# Patient Record
Sex: FEMALE | Race: WHITE | Employment: UNEMPLOYED | URBAN - METROPOLITAN AREA
[De-identification: names, ages, dates, MRNs, and addresses within clinical notes are randomized per-mention and may not be internally consistent; named-entity substitution may affect disease eponyms.]

---

## 2018-11-14 ENCOUNTER — OFFICE VISIT (OUTPATIENT)
Dept: FAMILY MEDICINE CLINIC | Age: 58
End: 2018-11-14

## 2018-11-14 VITALS
RESPIRATION RATE: 16 BRPM | HEART RATE: 80 BPM | BODY MASS INDEX: 31.08 KG/M2 | HEIGHT: 67 IN | SYSTOLIC BLOOD PRESSURE: 101 MMHG | OXYGEN SATURATION: 98 % | WEIGHT: 198 LBS | DIASTOLIC BLOOD PRESSURE: 68 MMHG | TEMPERATURE: 98.1 F

## 2018-11-14 DIAGNOSIS — G89.29 CHRONIC BILATERAL LOW BACK PAIN WITHOUT SCIATICA: ICD-10-CM

## 2018-11-14 DIAGNOSIS — M54.6 ACUTE BILATERAL THORACIC BACK PAIN: ICD-10-CM

## 2018-11-14 DIAGNOSIS — M54.50 CHRONIC BILATERAL LOW BACK PAIN WITHOUT SCIATICA: ICD-10-CM

## 2018-11-14 DIAGNOSIS — A69.20 ACUTE LYME DISEASE: Primary | ICD-10-CM

## 2018-11-14 DIAGNOSIS — Z23 ENCOUNTER FOR IMMUNIZATION: ICD-10-CM

## 2018-11-14 RX ORDER — DEXMETHYLPHENIDATE HYDROCHLORIDE 30 MG/1
CAPSULE, EXTENDED RELEASE ORAL
COMMUNITY
End: 2019-06-25

## 2018-11-14 RX ORDER — MELOXICAM 7.5 MG/1
15 TABLET ORAL DAILY
Qty: 30 TAB | Refills: 5 | Status: SHIPPED | OUTPATIENT
Start: 2018-11-14 | End: 2021-06-10

## 2018-11-14 RX ORDER — CYCLOBENZAPRINE HCL 10 MG
10 TABLET ORAL
Qty: 30 TAB | Refills: 5 | Status: SHIPPED | OUTPATIENT
Start: 2018-11-14

## 2018-11-14 RX ORDER — MELOXICAM 7.5 MG/1
TABLET ORAL DAILY
COMMUNITY
End: 2018-11-14

## 2018-11-14 RX ORDER — DOXYCYCLINE 100 MG/1
100 TABLET ORAL 2 TIMES DAILY
Qty: 28 TAB | Refills: 0 | Status: SHIPPED | OUTPATIENT
Start: 2018-11-14 | End: 2018-11-28

## 2018-11-14 RX ORDER — DEXMETHYLPHENIDATE HYDROCHLORIDE 10 MG/1
10 TABLET ORAL 2 TIMES DAILY
COMMUNITY
End: 2019-06-25

## 2018-11-14 RX ORDER — BUPROPION HYDROCHLORIDE 150 MG/1
TABLET, EXTENDED RELEASE ORAL 2 TIMES DAILY
COMMUNITY
End: 2021-06-10

## 2018-11-14 RX ORDER — TRAZODONE HYDROCHLORIDE 100 MG/1
100 TABLET ORAL
COMMUNITY
End: 2021-06-10

## 2018-11-14 NOTE — PATIENT INSTRUCTIONS
Rhomboid Muscle Strain: Rehab Exercises Your Care Instructions Here are some examples of typical rehabilitation exercises for your condition. Start each exercise slowly. Ease off the exercise if you start to have pain. Your doctor or physical therapist will tell you when you can start these exercises and which ones will work best for you. How to do the exercises Lower neck and upper back (rhomboid) stretch 1. Stretch your arms out in front of your body. Clasp one hand on top of your other hand. 2. Gently reach out so that you feel your shoulder blades stretching away from each other. 3. Gently bend your head forward. 4. Hold for 15 to 30 seconds. 5. Repeat 2 to 4 times. Resisted rows 1. Put the band around a solid object, such as a bedpost, at about waist level. Stand facing where you have placed the band. Hold equal lengths of the band in each hand. 2. Start with your arms held out in front of you. 3. Pull the bands back, and move your shoulder blades together. As you finish, your elbows should be at your side and bent at 90 degrees (like the angle of the letter \"L\"). 4. Return to the starting position. 5. Repeat 8 to 12 times. Neck stretches 1. Look straight ahead, and tip your right ear to your right shoulder. Do not let your left shoulder rise as you tip your head to the right. 2. Hold for 15 to 30 seconds. 3. Tilt your head to the left. Do not let your right shoulder rise as you tip your head to the left. 4. Hold for 15 to 30 seconds. 5. Repeat 2 to 4 times to each side. Neck rotation 1. Sit in a firm chair, or stand up straight. 2. Keeping your chin level, turn your head to the right, and hold for 15 to 30 seconds. 3. Turn your head to the left, and hold for 15 to 30 seconds. 4. Repeat 2 to 4 times to each side. Follow-up care is a key part of your treatment and safety.  Be sure to make and go to all appointments, and call your doctor if you are having problems. It's also a good idea to know your test results and keep a list of the medicines you take. Where can you learn more? Go to http://jo-brittney.info/. Enter 0841 31 00 89 in the search box to learn more about \"Rhomboid Muscle Strain: Rehab Exercises. \" Current as of: November 29, 2017 Content Version: 11.8 © 5654-5553 [employer]. Care instructions adapted under license by [employer] (which disclaims liability or warranty for this information). If you have questions about a medical condition or this instruction, always ask your healthcare professional. [place] [address] any warranty or liability for your use of this information.

## 2018-11-14 NOTE — PROGRESS NOTES
Chief Complaint Patient presents with [facility]  Back Pain  Dizziness  Immunization/Injection  
  flu shot Patient presents in office today to establish care and also to f/u for tick bite. Tick bite occurred Thursday and Sunday noticed a red ring around the bite. There is no ring present today. Would like to discuss back pain. States she has a loss of appetite. Also has c/o dizziness that she has had for several years. Would like to get flu shot No other concerns.

## 2018-11-14 NOTE — PROGRESS NOTES
Leslie Perez is a 60 y.o. female Chief Complaint Patient presents with [facility]  Back Pain  Dizziness  Immunization/Injection  
  flu shot Pt states she was bit by a tick last week then had a ring around it and is now gone. States the ring was small and has resolved. Pt does not know how long the tick was present. Discussed waiting to test vs treating and we will treat now 14 days. Pt also reports when she stands to cook or do anything she will get a sharp pain. States no hx of known arthritis, but back does feel more tight and less mobile. Pt has been using mobic 7.5 mg daily and this is not working. Pt was prev on MS Contin. States with the MS Contin she did sleep better. Pt reports 2 prior back surgeries. States the back is workers comp. Pt sees Dr Ilia Sin for her pain management and ADD medication. States she had 2 laminectomies. States current pain is not a nerve pain states it is more of a constant ache. IS also having pain in her thoracic spine into neck between shoulder blades. she is a 60y.o. year old female who presents for evalution. Reviewed PmHx, RxHx, FmHx, SocHx, AllgHx and updated and dated in the chart. Review of Systems - negative except as listed above in the HPI Objective:  
 
Vitals:  
 11/14/18 1427 BP: 101/68 Pulse: 80 Resp: 16 Temp: 98.1 °F (36.7 °C) TempSrc: Oral  
SpO2: 98% Weight: 198 lb (89.8 kg) Height: 5' 7\" (1.702 m) Current Outpatient Medications Medication Sig  
 buPROPion SR (WELLBUTRIN SR) 150 mg SR tablet Take  by mouth two (2) times a day.  Dexmethylphenidate 30 mg BP50 Take by mouth.  dexmethylphenidate 10 mg tablet Take 10 mg by mouth two (2) times a day.  traZODone (DESYREL) 100 mg tablet Take 100 mg by mouth nightly.  doxycycline (ADOXA) 100 mg tablet Take 1 Tab by mouth two (2) times a day for 14 days. May use caps if insurance requires  cyclobenzaprine (FLEXERIL) 10 mg tablet Take 1 Tab by mouth nightly as needed for Muscle Spasm(s).  meloxicam (MOBIC) 7.5 mg tablet Take 2 Tabs by mouth daily.  CHOLECALCIFEROL, VITAMIN D3, (VITAMIN D3 PO) Take  by mouth.  DULoxetine (CYMBALTA) 30 mg capsule  DULOXETINE HCL (CYMBALTA PO) Take  by mouth.  methylphenidate (RITALIN) 10 mg tablet Take  by mouth.  Glucosamine &Chondroit-MV-Min3 [id number] mg tab Take  by mouth.  CALCIUM PO Take  by mouth.  KRILL OIL PO Take  by mouth.  PV W-O CONRADO/FERROUS FUMARATE/FA (M-VIT PO) Take  by mouth.  LEVOMILNACIPRAN HYDROCHLORIDE (FETZIMA PO) Take  by mouth. No current facility-administered medications for this visit. Physical Examination: General appearance - alert, well appearing, and in no distress Eyes - pupils equal and reactive, extraocular eye movements intact Chest - clear to auscultation, no wheezes, rales or rhonchi, symmetric air entry Heart - normal rate, regular rhythm, normal S1, S2, no murmurs, rubs, clicks or gallops Back exam - ttp over b/l rhomboids and lev scap muscles. TTP over b/l lumbar paraspinal, neg slr b/l, pain with R and L side bending and pain with flexion of spine but not with extension Skin - picture of tick bite showed central area with small amount of clearing then another ring, looked like a small erythema migrans Assessment/ Plan:  
Diagnoses and all orders for this visit: 
 
1. Acute Lyme disease 
-     doxycycline (ADOXA) 100 mg tablet; Take 1 Tab by mouth two (2) times a day for 14 days. May use caps if insurance requires Given erythema migrans will treat and defer testing since this usually takes a couple weeks before positive 2. Encounter for immunization 
-     INFLUENZA VIRUS VAC QUAD,SPLIT,PRESV FREE SYRINGE IM 
-     RI IMMUNIZ ADMIN,1 SINGLE/COMB VAC/TOXOID 3. Chronic bilateral low back pain without sciatica -     cyclobenzaprine (FLEXERIL) 10 mg tablet; Take 1 Tab by mouth nightly as needed for Muscle Spasm(s). -     meloxicam (MOBIC) 7.5 mg tablet; Take 2 Tabs by mouth daily. 4. Acute bilateral thoracic back pain 
-     cyclobenzaprine (FLEXERIL) 10 mg tablet; Take 1 Tab by mouth nightly as needed for Muscle Spasm(s). -     meloxicam (MOBIC) 7.5 mg tablet; Take 2 Tabs by mouth daily. Follow-up Disposition: 
Return if symptoms worsen or fail to improve. I have discussed the diagnosis with the patient and the intended plan as seen in the above orders. The patient has received an after-visit summary and questions were answered concerning future plans. Pt conveyed understanding of plan. Medication Side Effects and Warnings were discussed with patient [address] [facility] [address] DO Discussed the patient's BMI with her. The BMI follow up plan is as follows:  
 
dietary management education, guidance, and counseling 
encourage exercise 
monitor weight 
prescribed dietary intake An After Visit Summary was printed and given to the patient.

## 2019-06-25 ENCOUNTER — OFFICE VISIT (OUTPATIENT)
Dept: FAMILY MEDICINE CLINIC | Age: 59
End: 2019-06-25

## 2019-06-25 ENCOUNTER — HOSPITAL ENCOUNTER (OUTPATIENT)
Dept: GENERAL RADIOLOGY | Age: 59
Discharge: HOME | End: 2019-06-25
Payer: MEDICARE

## 2019-06-25 VITALS
SYSTOLIC BLOOD PRESSURE: 102 MMHG | HEIGHT: 67 IN | TEMPERATURE: 98.5 F | HEART RATE: 87 BPM | RESPIRATION RATE: 16 BRPM | DIASTOLIC BLOOD PRESSURE: 70 MMHG | BODY MASS INDEX: 29.98 KG/M2 | WEIGHT: 191 LBS | OXYGEN SATURATION: 98 %

## 2019-06-25 DIAGNOSIS — M54.5 ACUTE RIGHT-SIDED LOW BACK PAIN, WITH SCIATICA PRESENCE UNSPECIFIED: ICD-10-CM

## 2019-06-25 DIAGNOSIS — M70.61 TROCHANTERIC BURSITIS OF RIGHT HIP: Primary | ICD-10-CM

## 2019-06-25 DIAGNOSIS — M79.661 RIGHT CALF PAIN: ICD-10-CM

## 2019-06-25 PROCEDURE — 72100 X-RAY EXAM L-S SPINE 2/3 VWS: CPT

## 2019-06-25 RX ORDER — PREDNISONE 10 MG/1
TABLET ORAL
Qty: 21 TAB | Refills: 0 | Status: SHIPPED | OUTPATIENT
Start: 2019-06-25 | End: 2021-06-10

## 2019-06-25 NOTE — PROGRESS NOTES
Chief Complaint   Patient presents with    Hip Pain    Knee Pain    Leg Pain     Patient presents in office today with c/o right hip, knee and leg pain for 2-3 months. Treating with meloxicam with no relief. No other concerns. 1. Have you been to the ER, urgent care clinic since your last visit? Hospitalized since your last visit? No    2. Have you seen or consulted any other health care providers outside of the [address] [place] [address] since your last visit? Include any pap smears or colon screening.  No    Learning Assessment 6/25/2019   PRIMARY LEARNER Patient   PRIMARY LANGUAGE ENGLISH   LEARNER PREFERENCE PRIMARY LISTENING   ANSWERED BY self   RELATIONSHIP SELF

## 2019-06-25 NOTE — PATIENT INSTRUCTIONS
Hip Bursitis: Exercises  Your Care Instructions  Here are some examples of typical rehabilitation exercises for your condition. Start each exercise slowly. Ease off the exercise if you start to have pain. Your doctor or physical therapist will tell you when you can start these exercises and which ones will work best for you. How to do the exercises  Hip rotator stretch    1. Lie on your back with both knees bent and your feet flat on the floor. 2. Put the ankle of your affected leg on your opposite thigh near your knee. 3. Use your hand to gently push your knee away from your body until you feel a gentle stretch around your hip. 4. Hold the stretch for 15 to 30 seconds. 5. Repeat 2 to 4 times. 6. Repeat steps 1 through 5, but this time use your hand to gently pull your knee toward your opposite shoulder. Iliotibial band stretch    1. Lean sideways against a wall. If you are not steady on your feet, hold on to a chair or counter. 2. Stand on the leg with the affected hip, with that leg close to the wall. Then cross your other leg in front of it. 3. Let your affected hip drop out to the side of your body and against wall. Then lean away from your affected hip until you feel a stretch. 4. Hold the stretch for 15 to 30 seconds. 5. Repeat 2 to 4 times. Straight-leg raises to the outside    1. Lie on your side, with your affected hip on top. 2. Tighten the front thigh muscles of your top leg to keep your knee straight. 3. Keep your hip and your leg straight in line with the rest of your body, and keep your knee pointing forward. Do not drop your hip back. 4. Lift your top leg straight up toward the ceiling, about 12 inches off the floor. Hold for about 6 seconds, then slowly lower your leg. 5. Repeat 8 to 12 times. Clamshell    1. Lie on your side, with your affected hip on top and your head propped on a pillow. Keep your feet and knees together and your knees bent.   2. Raise your top knee, but keep your feet together. Do not let your hips roll back. Your legs should open up like a clamshell. 3. Hold for 6 seconds. 4. Slowly lower your knee back down. Rest for 10 seconds. 5. Repeat 8 to 12 times. Follow-up care is a key part of your treatment and safety. Be sure to make and go to all appointments, and call your doctor if you are having problems. It's also a good idea to know your test results and keep a list of the medicines you take. Where can you learn more? Go to http://jo-brittney.info/. Enter T324 in the search box to learn more about \"Hip Bursitis: Exercises. \"  Current as of: September 20, 2018  Content Version: 11.9  © 3032-0353 [employer]. Care instructions adapted under license by [employer] (which disclaims liability or warranty for this information). If you have questions about a medical condition or this instruction, always ask your healthcare professional. [place] [address] any warranty or liability for your use of this information.

## 2019-06-25 NOTE — PROGRESS NOTES
Shanel Hurtado is a 60 y.o. female   Chief Complaint   Patient presents with    Hip Pain    Knee Pain    Leg Pain    pt states she is having pain in her R hip and is traveling down to her R knee and wraps around front of leg. States knee hurts with walking up stairs and has to lead with her L. States L side is fine. Denies any shjooting or electric like pains. Pain right now is a 2/10, when getting up will go up to around 6/10. Pt is also having some calf ttp on the R no edema or erythema. Does drive to [place] went this past weekend. she is a 60y.o. year old female who presents for evalution. Reviewed PmHx, RxHx, FmHx, SocHx, AllgHx and updated and dated in the chart. Review of Systems - negative except as listed above in the HPI    Objective:     Vitals:    06/25/19 1530   BP: 102/70   Pulse: 87   Resp: 16   Temp: 98.5 °F (36.9 °C)   TempSrc: Oral   SpO2: 98%   Weight: 191 lb (86.6 kg)   Height: 5' 7\" (1.702 m)       Current Outpatient Medications   Medication Sig    predniSONE (STERAPRED DS) 10 mg dose pack See administration instruction per 10mg dose pack    buPROPion SR (WELLBUTRIN SR) 150 mg SR tablet Take  by mouth two (2) times a day.  traZODone (DESYREL) 100 mg tablet Take 100 mg by mouth nightly.  cyclobenzaprine (FLEXERIL) 10 mg tablet Take 1 Tab by mouth nightly as needed for Muscle Spasm(s).  meloxicam (MOBIC) 7.5 mg tablet Take 2 Tabs by mouth daily.  methylphenidate (RITALIN) 10 mg tablet Take  by mouth.  CHOLECALCIFEROL, VITAMIN D3, (VITAMIN D3 PO) Take  by mouth.  Glucosamine &Chondroit-MV-Min3 [id number] mg tab Take  by mouth.  CALCIUM PO Take  by mouth.  PV W-O CONRADO/FERROUS FUMARATE/FA (M-VIT PO) Take  by mouth.  DULoxetine (CYMBALTA) 30 mg capsule     DULOXETINE HCL (CYMBALTA PO) Take  by mouth.  KRILL OIL PO Take  by mouth.  LEVOMILNACIPRAN HYDROCHLORIDE (FETZIMA PO) Take  by mouth.      No current facility-administered medications for this visit. Physical Examination: General appearance - alert, well appearing, and in no distress  Chest - clear to auscultation, no wheezes, rales or rhonchi, symmetric air entry  Heart - normal rate, regular rhythm, normal S1, S2, no murmurs, rubs, clicks or gallops  Back exam - R sided lumbar patraspinal ttp   Musculoskeletal - ttp over trochanteric bursa, right calf ttp no erythema or edema    Assessment/ Plan:   Diagnoses and all orders for this visit:    1. Trochanteric bursitis of right hip  -     predniSONE (STERAPRED DS) 10 mg dose pack; See administration instruction per 10mg dose pack    2. Acute right-sided low back pain, with sciatica presence unspecified  -     XR SPINE LUMB 2 OR 3 V; Future  -     predniSONE (STERAPRED DS) 10 mg dose pack; See administration instruction per 10mg dose pack    3. Right calf pain  -     D DIMER       Follow-up and Dispositions    · Return if symptoms worsen or fail to improve. I have discussed the diagnosis with the patient and the intended plan as seen in the above orders. The patient has received an after-visit summary and questions were answered concerning future plans. Pt conveyed understanding of plan.     Medication Side Effects and Warnings were discussed with patient      Veronica Cannon,

## 2019-06-26 DIAGNOSIS — M79.604 RIGHT LEG PAIN: ICD-10-CM

## 2019-06-26 DIAGNOSIS — R79.89 POSITIVE D DIMER: Primary | ICD-10-CM

## 2019-06-26 LAB — D DIMER PPP FEU-MCNC: 0.56 MG/L FEU (ref 0–0.49)

## 2019-06-26 NOTE — PROGRESS NOTES
Pt notified as below per Dr. Smooth Chavez. Verbalizes understanding & is agreeable to plan. State will call office back if she wants to move forward with PT and/or MRI.

## 2019-08-02 ENCOUNTER — TELEPHONE (OUTPATIENT)
Dept: FAMILY MEDICINE CLINIC | Age: 59
End: 2019-08-02

## 2019-08-02 DIAGNOSIS — Z12.39 BREAST CANCER SCREENING: Primary | ICD-10-CM

## 2019-08-02 NOTE — TELEPHONE ENCOUNTER
[facility] imagine, [phone] to schedule.   Pt is due for her [address] as well if she can schedule this for this month for labs

## 2019-08-02 NOTE — TELEPHONE ENCOUNTER
Patient is asking for an order to get a mammogram done and where to go for that. Call back number for her is [phone]. Thanks.

## 2019-08-09 NOTE — TELEPHONE ENCOUNTER
Patient returned call. Provided her with the number to schedule her mammo. Patient states she will call back to schedule AMW.

## 2019-08-12 ENCOUNTER — TELEPHONE (OUTPATIENT)
Dept: FAMILY MEDICINE CLINIC | Age: 59
End: 2019-08-12

## 2019-08-12 NOTE — TELEPHONE ENCOUNTER
Called and spoke with patient.  She states that the  told her that her order needs to be changed to diagnostic due to have pain in her breast.

## 2019-08-13 DIAGNOSIS — N64.4 BREAST PAIN: Primary | ICD-10-CM

## 2019-08-13 NOTE — TELEPHONE ENCOUNTER
Order updated.   In the future if she is having an issue she needs to inform us so we can order the proper test.

## 2019-08-14 ENCOUNTER — TELEPHONE (OUTPATIENT)
Dept: FAMILY MEDICINE CLINIC | Age: 59
End: 2019-08-14

## 2019-08-14 NOTE — TELEPHONE ENCOUNTER
2nd attempt. Called and informed patient that diagnostic mammo has been ordered.  Informed her that in the future to let us know if she is having any problems so that we can order the correct test.

## 2019-08-22 ENCOUNTER — HOSPITAL ENCOUNTER (OUTPATIENT)
Dept: MAMMOGRAPHY | Age: 59
Discharge: HOME | End: 2019-08-22
Attending: FAMILY MEDICINE
Payer: MEDICARE

## 2019-08-22 DIAGNOSIS — Z12.31 VISIT FOR SCREENING MAMMOGRAM: ICD-10-CM

## 2019-08-22 PROCEDURE — 77067 SCR MAMMO BI INCL CAD: CPT

## 2021-03-17 ENCOUNTER — TRANSCRIBE ORDER (OUTPATIENT)
Dept: SCHEDULING | Age: 61
End: 2021-03-17

## 2021-03-17 DIAGNOSIS — M50.122 CERVICAL DISC DISORDER AT C5-C6 LEVEL WITH RADICULOPATHY: Primary | ICD-10-CM

## 2021-04-01 ENCOUNTER — HOSPITAL ENCOUNTER (OUTPATIENT)
Dept: MRI IMAGING | Age: 61
Discharge: HOME | End: 2021-04-01
Attending: PSYCHIATRY & NEUROLOGY
Payer: MEDICARE

## 2021-04-01 DIAGNOSIS — M50.122 CERVICAL DISC DISORDER AT C5-C6 LEVEL WITH RADICULOPATHY: ICD-10-CM

## 2021-04-01 PROCEDURE — 72141 MRI NECK SPINE W/O DYE: CPT

## 2021-06-10 ENCOUNTER — OFFICE VISIT (OUTPATIENT)
Dept: FAMILY MEDICINE CLINIC | Age: 61
End: 2021-06-10
Payer: MEDICARE

## 2021-06-10 VITALS
SYSTOLIC BLOOD PRESSURE: 100 MMHG | HEART RATE: 82 BPM | DIASTOLIC BLOOD PRESSURE: 53 MMHG | BODY MASS INDEX: 32.65 KG/M2 | WEIGHT: 208 LBS | HEIGHT: 67 IN | RESPIRATION RATE: 18 BRPM | OXYGEN SATURATION: 97 % | TEMPERATURE: 98.3 F

## 2021-06-10 DIAGNOSIS — R73.01 IMPAIRED FASTING GLUCOSE: ICD-10-CM

## 2021-06-10 DIAGNOSIS — R31.9 HEMATURIA, UNSPECIFIED TYPE: Primary | ICD-10-CM

## 2021-06-10 DIAGNOSIS — Z13.220 LIPID SCREENING: ICD-10-CM

## 2021-06-10 DIAGNOSIS — Z13.29 SCREENING FOR THYROID DISORDER: ICD-10-CM

## 2021-06-10 LAB
BILIRUB UR QL STRIP: NEGATIVE
GLUCOSE UR-MCNC: NEGATIVE MG/DL
KETONES P FAST UR STRIP-MCNC: NEGATIVE MG/DL
PH UR STRIP: 6.5 [PH] (ref 4.6–8)
PROT UR QL STRIP: NEGATIVE
SP GR UR STRIP: 1.03 (ref 1–1.03)
UA UROBILINOGEN AMB POC: NORMAL (ref 0.2–1)
URINALYSIS CLARITY POC: CLEAR
URINALYSIS COLOR POC: YELLOW
URINE BLOOD POC: NORMAL
URINE LEUKOCYTES POC: NEGATIVE
URINE NITRITES POC: NEGATIVE

## 2021-06-10 PROCEDURE — G9717 DOC PT DX DEP/BP F/U NT REQ: HCPCS | Performed by: NURSE PRACTITIONER

## 2021-06-10 PROCEDURE — G8427 DOCREV CUR MEDS BY ELIG CLIN: HCPCS | Performed by: NURSE PRACTITIONER

## 2021-06-10 PROCEDURE — 3017F COLORECTAL CA SCREEN DOC REV: CPT | Performed by: NURSE PRACTITIONER

## 2021-06-10 PROCEDURE — G8417 CALC BMI ABV UP PARAM F/U: HCPCS | Performed by: NURSE PRACTITIONER

## 2021-06-10 PROCEDURE — 81003 URINALYSIS AUTO W/O SCOPE: CPT | Performed by: NURSE PRACTITIONER

## 2021-06-10 PROCEDURE — 99214 OFFICE O/P EST MOD 30 MIN: CPT | Performed by: NURSE PRACTITIONER

## 2021-06-10 NOTE — PROGRESS NOTES
Chief Complaint   Patient presents with    Blood in Urine     Patient in office today for blood in urine that was noted on Friday. Will note intermittently-last seen yesterday. Urine pink and blood on the tissue with wiping. Denies any history of hematuria prior to this. Denies any history of kidney stones. Denies any pain with urination. No new back pain, \"I have back pain all the time. \"     Pt have noted body aches. Have a hx of urinary frequency, retention, and lower back pain. Have not treated with otc. Pt have seen urogyn in May-exam was normal. Dr. Billie Molina. Pt saw urogyn due to bladder prolapse. Denies previously needing bladder scope. Denies any smoking history. Denies any family history of bladder cancer. Not on any blood thinners. Has some intermittent abd pain. Pt is due for a colonoscopy. Father had colon cancer. Last colonoscopy was 7 years ago. Chief Complaint   Patient presents with    Blood in Urine     she is a 63y.o. year old female who presents for evalution. Reviewed PmHx, RxHx, FmHx, SocHx, AllgHx and updated and dated in the chart.     Review of Systems - negative except as listed above in the HPI    Objective:     Vitals:    06/10/21 1554   BP: (!) 100/53   Pulse: 82   Resp: 18   Temp: 98.3 °F (36.8 °C)   TempSrc: Oral   SpO2: 97%   Weight: 208 lb (94.3 kg)   Height: 5' 7\" (1.702 m)     Physical Examination: General appearance - alert, well appearing, and in no distress  Mental status - normal mood, behavior, speech, dress, motor activity, and thought processes  Eyes - pupils equal and reactive, extraocular eye movements intact  Ears - bilateral TM's and external ear canals normal  Nose - normal and patent, no erythema, discharge or polyps and normal nontender sinuses  Mouth - mucous membranes moist, pharynx normal without lesions  Neck - supple, no significant adenopathy  Chest - clear to auscultation, no wheezes, rales or rhonchi, symmetric air entry  Heart - normal rate, regular rhythm, normal S1, S2, no murmurs  Abdomen - soft, nontender, nondistended, no masses or organomegaly  bowel sounds normal  no CVA tenderness  Extremities - peripheral pulses normal, no edema, no clubbing or cyanosis  Skin - normal coloration and turgor, no rashes, no suspicious skin lesions noted    Assessment/ Plan:   Diagnoses and all orders for this visit:    1. Hematuria, unspecified type  -     AMB POC URINALYSIS DIP STICK AUTO W/O MICRO  -     CT ABD PELV W WO CONT; Future  -     METABOLIC PANEL, COMPREHENSIVE; Future  -     CBC WITH AUTOMATED DIFF; Future  Will notify results and deviate plan based on findings. Continue to monitor closely. Reviewed acute/worsening s/sx that warrant more immediate medical attention. Enc pt to schedule an appt with GI for colonoscopy, overdue. 2. Lipid screening  -     LIPID PANEL; Future  Will take lab slip to [facility] and complete fasting. 3. Screening for thyroid disorder  -     TSH 3RD GENERATION; Future  Screening, asx.   4. Impaired fasting glucose  -     HEMOGLOBIN A1C WITH EAG; Future  Will notify results and deviate plan based on findings. Follow-up and Dispositions    · Return if symptoms worsen or fail to improve. I have discussed the diagnosis with the patient and the intended plan as seen in the above orders. The patient has received an after-visit summary and questions were answered concerning future plans. Medication Side Effects and Warnings were discussed with patient: yes  Patient Labs were reviewed and or requested: yes  Patient Past Records were reviewed and or requested  yes  Patient / Caregiver Understanding of treatment plan was verbalized during office visit YES    NETO Crawford    There are no Patient Instructions on file for this visit.

## 2021-06-16 LAB
ALBUMIN SERPL-MCNC: 4.4 G/DL (ref 3.8–4.8)
ALBUMIN/GLOB SERPL: 1.8 {RATIO} (ref 1.2–2.2)
ALP SERPL-CCNC: 109 IU/L (ref 48–121)
ALT SERPL-CCNC: 22 IU/L (ref 0–32)
AST SERPL-CCNC: 19 IU/L (ref 0–40)
BASOPHILS # BLD AUTO: 0 X10E3/UL (ref 0–0.2)
BASOPHILS NFR BLD AUTO: 1 %
BILIRUB SERPL-MCNC: 0.4 MG/DL (ref 0–1.2)
BUN SERPL-MCNC: 13 MG/DL (ref 8–27)
BUN/CREAT SERPL: 21 (ref 12–28)
CALCIUM SERPL-MCNC: 9.3 MG/DL (ref 8.7–10.3)
CHLORIDE SERPL-SCNC: 103 MMOL/L (ref 96–106)
CHOLEST SERPL-MCNC: 288 MG/DL (ref 100–199)
CO2 SERPL-SCNC: 25 MMOL/L (ref 20–29)
CREAT SERPL-MCNC: 0.63 MG/DL (ref 0.57–1)
EOSINOPHIL # BLD AUTO: 0.3 X10E3/UL (ref 0–0.4)
EOSINOPHIL NFR BLD AUTO: 4 %
ERYTHROCYTE [DISTWIDTH] IN BLOOD BY AUTOMATED COUNT: 13.3 % (ref 11.7–15.4)
GLOBULIN SER CALC-MCNC: 2.5 G/DL (ref 1.5–4.5)
GLUCOSE SERPL-MCNC: 119 MG/DL (ref 65–99)
HBA1C MFR BLD: 5.9 % (ref 4.8–5.6)
HCT VFR BLD AUTO: 40.3 % (ref 34–46.6)
HDLC SERPL-MCNC: 63 MG/DL
HGB BLD-MCNC: 13.7 G/DL (ref 11.1–15.9)
IMM GRANULOCYTES # BLD AUTO: 0 X10E3/UL (ref 0–0.1)
IMM GRANULOCYTES NFR BLD AUTO: 0 %
IMP & REVIEW OF LAB RESULTS: NORMAL
LDLC SERPL CALC-MCNC: 203 MG/DL (ref 0–99)
LYMPHOCYTES # BLD AUTO: 1.2 X10E3/UL (ref 0.7–3.1)
LYMPHOCYTES NFR BLD AUTO: 21 %
MCH RBC QN AUTO: 29.7 PG (ref 26.6–33)
MCHC RBC AUTO-ENTMCNC: 34 G/DL (ref 31.5–35.7)
MCV RBC AUTO: 87 FL (ref 79–97)
MONOCYTES # BLD AUTO: 0.5 X10E3/UL (ref 0.1–0.9)
MONOCYTES NFR BLD AUTO: 9 %
NEUTROPHILS # BLD AUTO: 3.7 X10E3/UL (ref 1.4–7)
NEUTROPHILS NFR BLD AUTO: 65 %
PLATELET # BLD AUTO: 256 X10E3/UL (ref 150–450)
POTASSIUM SERPL-SCNC: 4.8 MMOL/L (ref 3.5–5.2)
PROT SERPL-MCNC: 6.9 G/DL (ref 6–8.5)
RBC # BLD AUTO: 4.61 X10E6/UL (ref 3.77–5.28)
SODIUM SERPL-SCNC: 142 MMOL/L (ref 134–144)
TRIGL SERPL-MCNC: 125 MG/DL (ref 0–149)
TSH SERPL DL<=0.005 MIU/L-ACNC: 1.41 UIU/ML (ref 0.45–4.5)
VLDLC SERPL CALC-MCNC: 22 MG/DL (ref 5–40)
WBC # BLD AUTO: 5.7 X10E3/UL (ref 3.4–10.8)

## 2021-06-17 ENCOUNTER — TELEPHONE (OUTPATIENT)
Dept: FAMILY MEDICINE CLINIC | Age: 61
End: 2021-06-17

## 2021-06-17 DIAGNOSIS — E78.2 MIXED HYPERLIPIDEMIA: Primary | ICD-10-CM

## 2021-06-17 PROBLEM — R73.03 PREDIABETES: Status: ACTIVE | Noted: 2021-06-17

## 2021-06-17 RX ORDER — ATORVASTATIN CALCIUM 20 MG/1
20 TABLET, FILM COATED ORAL DAILY
Qty: 90 TABLET | Refills: 1 | Status: SHIPPED | OUTPATIENT
Start: 2021-06-17

## 2021-06-17 NOTE — PROGRESS NOTES
Please notify pt the followin. Was she truly fasting for these labs? Cholesterol is through the roof putting her at risk for heart attack and stroke. I recommend she consider starting a low dose cholesterol med daily to decrease that risk and work on diet. 2. Labs suggest she has prediabetes. Enc to follow a low carb diet. Will need to start having labs every 6 months to monitor this closely to make sure it does not become type 2 diabetes. If it trends up, will need to start medication. Will send letter with results and recs. 3. Normal thyroid function. All other labs are normal. Let me know what she decides about starting a daily cholesterol med.

## 2021-06-17 NOTE — TELEPHONE ENCOUNTER
I recommend pt complete CT that was ordered during OV. Lipitor sent to pharmacy. Repeat fasting labs in 4 weeks to make sure there is improvement.

## 2021-06-17 NOTE — PROGRESS NOTES
Spoke with pt in regards to lab results; pt stated they understood. In agreement with starting chol medication-is follow up needed sooner than 6 months since starting med?

## 2021-06-17 NOTE — TELEPHONE ENCOUNTER
Spoke with pt in regards to labs-refer to result note. In addition pt had multiple concerns:    Saw pain specialist and was advised instead of a ct scan an MRI would be more appropriate-pt would like to know if order can be changed. Pt also states since lov have noted left side flank pain now,and concern could be due to kidney stone. During visit pt denied hx of kidney stones,and denies urinary sx or hematuria at time of call with nurse.

## 2021-06-18 ENCOUNTER — HOSPITAL ENCOUNTER (OUTPATIENT)
Dept: CT IMAGING | Age: 61
Discharge: HOME | End: 2021-06-18
Payer: MEDICARE

## 2021-06-18 DIAGNOSIS — R31.9 HEMATURIA, UNSPECIFIED TYPE: ICD-10-CM

## 2021-06-18 PROCEDURE — 74178 CT ABD&PLV WO CNTR FLWD CNTR: CPT

## 2021-06-18 PROCEDURE — 74011000636 HC RX REV CODE- 636: Performed by: RADIOLOGY

## 2021-06-18 RX ADMIN — IOPAMIDOL 100 ML: 612 INJECTION, SOLUTION INTRAVENOUS at 16:28

## 2021-06-21 ENCOUNTER — TELEPHONE (OUTPATIENT)
Dept: FAMILY MEDICINE CLINIC | Age: 61
End: 2021-06-21

## 2021-06-21 NOTE — PROGRESS NOTES
Called and left VM on pt's 's phone for them to return call. I believe her cell number is the wrong number. The VM box belonged to a [address] , [place] U[place]e is HIPAA ok.

## 2021-06-21 NOTE — TELEPHONE ENCOUNTER
I already called number listed, I believe her cell number has changed, VM box belongs to a [place]. I left a VM on husbands cell # Jerson Horta, HIPAA ok) for them to call the office back. We need to update her cell number as well. Thanks.

## 2021-06-21 NOTE — PROGRESS NOTES
Good evening Ms. Edin Brody,  Your CT of the abdomen and pelvis shows no acute findings. The kidneys look normal. You do appear to have some fatty liver disease. There are some arthritic changes noted in the lumbar spine. Otherwise everything else looks normal. Please let me know if you have any questions.    Jimi Parkinson

## 2021-10-19 ENCOUNTER — OFFICE VISIT (OUTPATIENT)
Dept: FAMILY MEDICINE CLINIC | Age: 61
End: 2021-10-19
Payer: MEDICARE

## 2021-10-19 VITALS
OXYGEN SATURATION: 97 % | DIASTOLIC BLOOD PRESSURE: 78 MMHG | WEIGHT: 206 LBS | RESPIRATION RATE: 18 BRPM | TEMPERATURE: 98.1 F | HEART RATE: 69 BPM | SYSTOLIC BLOOD PRESSURE: 117 MMHG | HEIGHT: 67 IN | BODY MASS INDEX: 32.33 KG/M2

## 2021-10-19 DIAGNOSIS — L98.9 SKIN LESION: Primary | ICD-10-CM

## 2021-10-19 DIAGNOSIS — Z23 ENCOUNTER FOR IMMUNIZATION: ICD-10-CM

## 2021-10-19 PROCEDURE — 99213 OFFICE O/P EST LOW 20 MIN: CPT | Performed by: FAMILY MEDICINE

## 2021-10-19 PROCEDURE — 3017F COLORECTAL CA SCREEN DOC REV: CPT | Performed by: FAMILY MEDICINE

## 2021-10-19 PROCEDURE — G9717 DOC PT DX DEP/BP F/U NT REQ: HCPCS | Performed by: FAMILY MEDICINE

## 2021-10-19 PROCEDURE — 90715 TDAP VACCINE 7 YRS/> IM: CPT | Performed by: FAMILY MEDICINE

## 2021-10-19 PROCEDURE — G8417 CALC BMI ABV UP PARAM F/U: HCPCS | Performed by: FAMILY MEDICINE

## 2021-10-19 PROCEDURE — G8427 DOCREV CUR MEDS BY ELIG CLIN: HCPCS | Performed by: FAMILY MEDICINE

## 2021-10-19 RX ORDER — ZINC GLUCONATE 10 MG
LOZENGE ORAL
COMMUNITY

## 2021-10-19 NOTE — PROGRESS NOTES
Tara Cheney is a 57 y.o. female    Chief Complaint   Patient presents with    Mole     on neck under ear rt side; noticed 1 mo ago; discuss Tdap vaccine       Visit Vitals  /78   Pulse 69   Temp 98.1 °F (36.7 °C) (Oral)   Resp 18   Ht 5' 7\" (1.702 m)   Wt 206 lb (93.4 kg)   SpO2 97%   BMI 32.26 kg/m²       3 most recent PHQ Screens 9/11/2015   Little interest or pleasure in doing things Not at all   Feeling down, depressed, irritable, or hopeless Not at all   Total Score PHQ 2 0       No flowsheet data found. No flowsheet data found. 1. Have you been to the ER, urgent care clinic since your last visit? Hospitalized since your last visit? No     2. Have you seen or consulted any other health care providers outside of the [address] [place] [address] since your last visit? Include any pap smears or colon screening.  No

## 2022-03-18 PROBLEM — E78.2 MIXED HYPERLIPIDEMIA: Status: ACTIVE | Noted: 2021-06-17

## 2022-03-20 PROBLEM — R73.03 PREDIABETES: Status: ACTIVE | Noted: 2021-06-17

## 2022-08-23 ENCOUNTER — NURSE TRIAGE (OUTPATIENT)
Dept: OTHER | Facility: CLINIC | Age: 62
End: 2022-08-23

## 2022-08-23 NOTE — TELEPHONE ENCOUNTER
Received call from [place] at [facility] with Red Flag Complaint. Current Symptoms: headache, body aches, fatigue, sore throat, loss of appetite, chills    Onset: 4 days ago;      Pain Severity: 5/10; Temperature: 99F     What has been tried: aspirin, tylenol, orange juice    Denies - diarrhea / vomiting / blue lips / stiff neck / ear pain        Recommended disposition: See in Office Today or Tomorrow    Care advice provided, patient verbalizes understanding; denies any other questions or concerns; instructed to call back for any new or worsening symptoms. Patient/Caller agrees with recommended disposition; writer provided warm transfer to [place] at [facility] for appointment scheduling    Attention Provider: Thank you for allowing me to participate in the care of your patient. The patient was connected to triage in response to information provided to the [facility]. Please do not respond through this encounter as the response is not directed to a shared pool. Reason for Disposition   Patient wants to be seen    Protocols used:  Influenza - Seasonal-ADULT-[place]

## 2022-08-24 ENCOUNTER — OFFICE VISIT (OUTPATIENT)
Dept: FAMILY MEDICINE CLINIC | Age: 62
End: 2022-08-24
Payer: MEDICARE

## 2022-08-24 VITALS
TEMPERATURE: 98.3 F | OXYGEN SATURATION: 97 % | BODY MASS INDEX: 31.53 KG/M2 | WEIGHT: 200.9 LBS | HEIGHT: 67 IN | DIASTOLIC BLOOD PRESSURE: 74 MMHG | SYSTOLIC BLOOD PRESSURE: 111 MMHG | HEART RATE: 90 BPM

## 2022-08-24 DIAGNOSIS — J06.9 VIRAL UPPER RESPIRATORY TRACT INFECTION: ICD-10-CM

## 2022-08-24 DIAGNOSIS — H66.90 ACUTE OTITIS MEDIA, UNSPECIFIED OTITIS MEDIA TYPE: Primary | ICD-10-CM

## 2022-08-24 PROCEDURE — 3017F COLORECTAL CA SCREEN DOC REV: CPT | Performed by: FAMILY MEDICINE

## 2022-08-24 PROCEDURE — 99213 OFFICE O/P EST LOW 20 MIN: CPT | Performed by: FAMILY MEDICINE

## 2022-08-24 PROCEDURE — G9717 DOC PT DX DEP/BP F/U NT REQ: HCPCS | Performed by: FAMILY MEDICINE

## 2022-08-24 PROCEDURE — G8417 CALC BMI ABV UP PARAM F/U: HCPCS | Performed by: FAMILY MEDICINE

## 2022-08-24 PROCEDURE — G8427 DOCREV CUR MEDS BY ELIG CLIN: HCPCS | Performed by: FAMILY MEDICINE

## 2022-08-24 RX ORDER — AMOXICILLIN AND CLAVULANATE POTASSIUM 875; 125 MG/1; MG/1
1 TABLET, FILM COATED ORAL EVERY 12 HOURS
Qty: 14 TABLET | Refills: 0 | Status: SHIPPED | OUTPATIENT
Start: 2022-08-24 | End: 2022-08-31

## 2022-08-24 RX ORDER — MORPHINE SULFATE 15 MG/1
TABLET ORAL
COMMUNITY
Start: 2022-07-09

## 2022-08-24 NOTE — PATIENT INSTRUCTIONS
Mucinex or dayquil cough and cold (or any other brand)-- they typically have advil/tylenol, guaifenesin, dextromethorphan, phenylephrine

## 2022-08-24 NOTE — PROGRESS NOTES
Justine Wang is a 60 y.o. female , id x 2(name and ). Reviewed record, history, and  medications. Chief Complaint   Patient presents with    Follow-up     Reports experiencing flu-like symptoms. Has been experiencing headaches, fever, body aches, chills, daily since 22. (R) ear pain started on 22. Hasn't had an appetite, experiencing nausea, no vomiting. Had PCR testing yesterday, currently awaiting results. Vitals:    22 0902   BP: 111/74   Pulse: 90   Temp: 98.3 °F (36.8 °C)   TempSrc: Oral   SpO2: 97%   Weight: 200 lb 14.4 oz (91.1 kg)   Height: 5' 7\" (1.702 m)       Coordination of Care Questionnaire:   1. Have you been to the ER, urgent care clinic since your last visit? Hospitalized since your last visit? No    2. Have you seen or consulted any other health care providers outside of the [address] [place] [address] since your last visit? Include any pap smears or colon screening.  Yes Dr. [facility]      3 most recent [place] [address] Screens 2022   Little interest or pleasure in doing things More than half the days   Feeling down, depressed, irritable, or hopeless More than half the days   Total Score PHQ 2 4   Trouble falling or staying asleep, or sleeping too much Nearly every day   Feeling tired or having little energy Nearly every day   Poor appetite, weight loss, or overeating More than half the days   Feeling bad about yourself - or that you are a failure or have let yourself or your family down More than half the days   Trouble concentrating on things such as school, work, reading, or watching TV Nearly every day   Moving or speaking so slowly that other people could have noticed; or the opposite being so fidgety that others notice Not at all   Thoughts of being better off dead, or hurting yourself in some way Not at all   PHQ 9 Score 17   How difficult have these problems made it for you to do your work, take care of your home and get along with others Somewhat difficult       Patient is accompanied by self I have received verbal consent from Lewis Michelle to discuss any/all medical information while they are present in the room.

## 2022-08-24 NOTE — PROGRESS NOTES
Arley Cardona (: 1960) is a 60 y.o. female, established patient, here for evaluation of the following chief complaint(s):  Follow-up (Reports experiencing flu-like symptoms. Yoseph Joshua been experiencing headaches, fever, body aches, chills, daily since 22. /(R) ear pain started on 22. /Hasn't had an appetite, experiencing nausea, no vomiting. Fredo Raya PCR testing yesterday, currently awaiting results.)         ASSESSMENT/PLAN:  Below is the assessment and plan developed based on review of pertinent history, physical exam, labs, studies, and medications. 1. Acute otitis media, unspecified otitis media type  -     amoxicillin-clavulanate (AUGMENTIN) 875-125 mg per tablet; Take 1 Tablet by mouth every twelve (12) hours for 7 days. , Normal, Disp-14 Tablet, R-0  2. Viral upper respiratory tract infection  -     amoxicillin-clavulanate (AUGMENTIN) 875-125 mg per tablet; Take 1 Tablet by mouth every twelve (12) hours for 7 days. , Normal, Disp-14 Tablet, R-0  -Mild sx Sx consistent with COVID vs influenza vs other upper viral URI. Passed window for antiviral for flu and no ability to test in office. She has PCR pending for COVID already. Will prescribe Augmentin for secondary otitis media   -rest, push fluids, cough and cold OTC meds for sx relief    -quarantine until PCR returns, then is positive wear mask around others for additional 5 days as she is already on day 6 of symptoms.   -ed precautions for severe or worsening sx and patient voiced understanding      SUBJECTIVE/OBJECTIVE:  Chief Complaint   Patient presents with    Follow-up     Reports experiencing flu-like symptoms. Has been experiencing headaches, fever, body aches, chills, daily since 22. (R) ear pain started on 22. Hasn't had an appetite, experiencing nausea, no vomiting. Had PCR testing yesterday, currently awaiting results. Patient presents to office for sickness.   2022 patient developed body aches, frontal headache, temp of 101, decreased appetite and chills. Sx persisted since then but has been fever free x 24 hours. Mild sore throat and ear pressure at onset of sx. Decreased appetite and mild LUQ episodic pain. No nausea/vomiting. Yesterday ear pressure turned to left ear pain. Sharp at times, aching all other times. Minimal cough of yellow sputum. Minimal nasal congestion. Rhinorrhea last 2 days. Post nasal drip is improving today. She has episodic R sided MSK chest pain x 7 years-- was told it was fibromyalgia with previous cardio workup, no chest pain or SOB in last 6 days. She has been taking vitamins and zinc. No other OTC treatments.  was sick before she was but did not get COVID tested. She had PCR test yesterday but result is not back yet. She has noticed over last several months she has been a little winded when walking more so than previously but no SOB in the last week. No dysuria, hematuria, back pain, lower extremity edema, hemoptysis, vision changes, dizziness, confusion, change in taste or smell. No diarrhea or constipation, no lower abdominal pain. Review of systems negative other than mentioned in HPI    Current Outpatient Medications on File Prior to Visit   Medication Sig Dispense Refill    morphine IR (MS IR) 15 mg tablet take 1 tablet by mouth twice a day if needed for EPISODE PAIN      multivits,Stress Formula-Zinc tablet Take 1 Tablet by mouth daily. magnesium 250 mg tab Take  by mouth. cyclobenzaprine (FLEXERIL) 10 mg tablet Take 1 Tab by mouth nightly as needed for Muscle Spasm(s). 30 Tab 5    CHOLECALCIFEROL, VITAMIN D3, (VITAMIN D3 PO) Take  by mouth. Glucosamine &Chondroit-MV-Min3 645-160-10-0.5 mg tab Take  by mouth. CALCIUM PO Take  by mouth. KRILL OIL PO Take  by mouth. atorvastatin (LIPITOR) 20 mg tablet Take 1 Tablet by mouth daily.  (Patient not taking: No sig reported) 90 Tablet 1    PV W-O CONRADO/FERROUS FUMARATE/FA (M-VIT PO) Take  by mouth. (Patient not taking: No sig reported)       No current facility-administered medications on file prior to visit. Patient Active Problem List    Diagnosis Date Noted    Prediabetes 06/17/2021    Mixed hyperlipidemia 06/17/2021    Depression     Tubular adenoma of colon     Multinodular goiter     Fibromyalgia      No Known Allergies    Past Surgical History:   Procedure Laterality Date    HX ENDOSCOPY      HX LUMBAR LAMINECTOMY  1993     Social History     Tobacco Use    Smoking status: Never    Smokeless tobacco: Never   Substance Use Topics    Alcohol use: No     Alcohol/week: 0.0 standard drinks    Drug use: No     Vitals:    08/24/22 0902   BP: 111/74   Pulse: 90   Temp: 98.3 °F (36.8 °C)   TempSrc: Oral   SpO2: 97%   Weight: 200 lb 14.4 oz (91.1 kg)   Height: 5' 7\" (1.702 m)   PainSc:   6   PainLoc: Back        Physical Exam  Constitutional:       Appearance: Normal appearance. HENT:      Head: Normocephalic and atraumatic. Right Ear: Hearing, ear canal and external ear normal. Tympanic membrane is erythematous and bulging. Left Ear: Hearing, tympanic membrane, ear canal and external ear normal.   Eyes:      Extraocular Movements: Extraocular movements intact. Conjunctiva/sclera: Conjunctivae normal.      Pupils: Pupils are equal, round, and reactive to light. Cardiovascular:      Rate and Rhythm: Normal rate and regular rhythm. Pulses: Normal pulses. Heart sounds: Normal heart sounds. Pulmonary:      Effort: Pulmonary effort is normal. No respiratory distress. Breath sounds: Normal breath sounds. No wheezing or rales. Chest:      Chest wall: No tenderness. Abdominal:      General: Abdomen is flat. Bowel sounds are normal. There is no distension. Palpations: Abdomen is soft. Tenderness: There is no abdominal tenderness. There is no right CVA tenderness. Musculoskeletal:      Right lower leg: No edema.       Left lower leg: No edema. Neurological:      Mental Status: She is alert. Psychiatric:         Mood and Affect: Mood normal.         Behavior: Behavior normal.         An electronic signature was used to authenticate this note.   -- Naomi Song PA-C

## 2023-11-17 NOTE — PROGRESS NOTES
Chief Complaint   Patient presents with    Blood in Urine     Patient in office today for blood in urine that was noted on Friday. Will note intermittently-last seen yesterday. Pt have noted body aches. Have a hx of urinary frequency,retention,and lower back pain. Have not treated with otc. Pt have seen urogyn in May-exam was normal.  Pt saw urogyn due to bladder prolapse. 1. Have you been to the ER, urgent care clinic since your last visit? Hospitalized since your last visit? No    2. Have you seen or consulted any other health care providers outside of the [address] [place] [address] since your last visit? Include any pap smears or colon screening.  No no rashes  , no suspicious lesions

## 2024-04-09 ENCOUNTER — OFFICE VISIT (OUTPATIENT)
Age: 64
End: 2024-04-09
Payer: MEDICARE

## 2024-04-09 VITALS
TEMPERATURE: 98.2 F | WEIGHT: 205 LBS | BODY MASS INDEX: 32.18 KG/M2 | RESPIRATION RATE: 16 BRPM | SYSTOLIC BLOOD PRESSURE: 121 MMHG | OXYGEN SATURATION: 97 % | HEART RATE: 77 BPM | DIASTOLIC BLOOD PRESSURE: 74 MMHG | HEIGHT: 67 IN

## 2024-04-09 DIAGNOSIS — R73.9 ELEVATED BLOOD SUGAR: ICD-10-CM

## 2024-04-09 DIAGNOSIS — R53.83 OTHER FATIGUE: ICD-10-CM

## 2024-04-09 DIAGNOSIS — R07.9 CHEST PAIN, UNSPECIFIED TYPE: ICD-10-CM

## 2024-04-09 DIAGNOSIS — R06.02 SOB (SHORTNESS OF BREATH): Primary | ICD-10-CM

## 2024-04-09 PROCEDURE — G8427 DOCREV CUR MEDS BY ELIG CLIN: HCPCS | Performed by: FAMILY MEDICINE

## 2024-04-09 PROCEDURE — 99214 OFFICE O/P EST MOD 30 MIN: CPT | Performed by: FAMILY MEDICINE

## 2024-04-09 PROCEDURE — 3017F COLORECTAL CA SCREEN DOC REV: CPT | Performed by: FAMILY MEDICINE

## 2024-04-09 PROCEDURE — 1036F TOBACCO NON-USER: CPT | Performed by: FAMILY MEDICINE

## 2024-04-09 PROCEDURE — G8417 CALC BMI ABV UP PARAM F/U: HCPCS | Performed by: FAMILY MEDICINE

## 2024-04-09 RX ORDER — TIZANIDINE 4 MG/1
4 TABLET ORAL EVERY 6 HOURS PRN
COMMUNITY
Start: 2024-03-28

## 2024-04-09 ASSESSMENT — PATIENT HEALTH QUESTIONNAIRE - PHQ9
SUM OF ALL RESPONSES TO PHQ QUESTIONS 1-9: 0
1. LITTLE INTEREST OR PLEASURE IN DOING THINGS: NOT AT ALL
SUM OF ALL RESPONSES TO PHQ QUESTIONS 1-9: 0
SUM OF ALL RESPONSES TO PHQ QUESTIONS 1-9: 0
2. FEELING DOWN, DEPRESSED OR HOPELESS: NOT AT ALL
SUM OF ALL RESPONSES TO PHQ QUESTIONS 1-9: 0
SUM OF ALL RESPONSES TO PHQ9 QUESTIONS 1 & 2: 0

## 2024-04-09 ASSESSMENT — LIFESTYLE VARIABLES
HOW MANY STANDARD DRINKS CONTAINING ALCOHOL DO YOU HAVE ON A TYPICAL DAY: PATIENT DOES NOT DRINK
HOW OFTEN DO YOU HAVE A DRINK CONTAINING ALCOHOL: NEVER

## 2024-04-09 NOTE — PROGRESS NOTES
Chief Complaint   Patient presents with    Medicare AWV     [facility] Screening show Carotid stenosis    Shortness of Breath     X 2 months    Lab Collection     NON Fasting    Back Pain     Dr Jina Kraft     \"Have you been to the ER, urgent care clinic since your last visit?  Hospitalized since your last visit?\"    NO    “Have you seen or consulted any other health [facility] providers [facility]?”    YES - When: approximately 2  weeks ago.  Where and Why: Dr Muro.    Have you had a mammogram?”   NO    Date of last Mammogram: 8/22/2019      “Have you had a pap smear?”    NO    No cervical cancer screening on file         “Have you had a colorectal cancer screening such as a colonoscopy/FIT/Cologuard?    NO    No colonoscopy on file  No cologuard on file  No FIT/FOBT on file   No flexible sigmoidoscopy on file         Click Here for Release of Records Request  Chief Complaint   Patient presents with    Medicare AWV     [facility] Screening show Carotid stenosis    Shortness of Breath     X 2 months    Lab Collection     NON Fasting    Back Pain     Dr Jina Kraft     She is a 63 y.o. female who presents for evalution.     Reviewed PmHx, RxHx, FmHx, SocHx, AllgHx and updated and dated in the chart.    CURRENT MEDS W/ ASSOC DIAG           Start Date End Date     atorvastatin (LIPITOR) 20 MG tablet  06/17/21  --     Associated Diagnoses:  --     CALCIUM PO  --  --     Associated Diagnoses:  --     cyclobenzaprine (FLEXERIL) 10 MG tablet  11/14/18  --     Associated Diagnoses:  --     KRILL OIL PO  --  --     Associated Diagnoses:  --     morphine (MSIR) 15 MG tablet  07/09/22  --     Associated Diagnoses:  --     tiZANidine (ZANAFLEX) 4 MG tablet  03/28/24  --     Associated Diagnoses:  --             Patient Active Problem List   Diagnosis Code    Mixed hyperlipidemia E78.2    Fibromyalgia M79.7    Depression F32.A    Multinodular goiter E04.2    Tubular

## 2024-04-09 NOTE — PROGRESS NOTES
Chief Complaint   Patient presents with    Medicare AWV     [facility] Screening show Carotid stenosis    Shortness of Breath     X 2 months    Lab Collection     NON Fasting    Back Pain     Dr Muro    Fatigue     \"Have you been to the ER, urgent care clinic since your last visit?  Hospitalized since your last visit?\"    NO    “Have you seen or consulted any other health care providers outside of [facility] since your last visit?”    YES - When: approximately 2  weeks ago.  Where and Why: Dr Muro.    Have you had a mammogram?”   NO    Date of last Mammogram: 8/22/2019      “Have you had a pap smear?”    NO    No cervical cancer screening on file         “Have you had a colorectal cancer screening such as a colonoscopy/FIT/Cologuard?    NO    No colonoscopy on file  No cologuard on file  No FIT/FOBT on file   No flexible sigmoidoscopy on file         Click Here for Release of Records Request

## 2024-04-10 LAB
ALBUMIN SERPL-MCNC: 3.8 G/DL (ref 3.5–5)
ALBUMIN/GLOB SERPL: 1.2 (ref 1.1–2.2)
ALP SERPL-CCNC: 115 U/L (ref 45–117)
ALT SERPL-CCNC: 24 U/L (ref 12–78)
ANION GAP SERPL CALC-SCNC: 2 MMOL/L (ref 5–15)
AST SERPL-CCNC: 19 U/L (ref 15–37)
BASOPHILS # BLD: 0 K/UL (ref 0–0.1)
BASOPHILS NFR BLD: 0 % (ref 0–1)
BILIRUB SERPL-MCNC: 0.3 MG/DL (ref 0.2–1)
BUN SERPL-MCNC: 15 MG/DL (ref 6–20)
BUN/CREAT SERPL: 22 (ref 12–20)
CALCIUM SERPL-MCNC: 9.5 MG/DL (ref 8.5–10.1)
CHLORIDE SERPL-SCNC: 108 MMOL/L (ref 97–108)
CO2 SERPL-SCNC: 29 MMOL/L (ref 21–32)
CREAT SERPL-MCNC: 0.67 MG/DL (ref 0.55–1.02)
DIFFERENTIAL METHOD BLD: NORMAL
EOSINOPHIL # BLD: 0.2 K/UL (ref 0–0.4)
EOSINOPHIL NFR BLD: 3 % (ref 0–7)
ERYTHROCYTE [DISTWIDTH] IN BLOOD BY AUTOMATED COUNT: 13.5 % (ref 11.5–14.5)
EST. AVERAGE GLUCOSE BLD GHB EST-MCNC: 117 MG/DL
GLOBULIN SER CALC-MCNC: 3.2 G/DL (ref 2–4)
GLUCOSE SERPL-MCNC: 106 MG/DL (ref 65–100)
HBA1C MFR BLD: 5.7 % (ref 4–5.6)
HCT VFR BLD AUTO: 39.7 % (ref 35–47)
HGB BLD-MCNC: 13.3 G/DL (ref 11.5–16)
IMM GRANULOCYTES # BLD AUTO: 0 K/UL (ref 0–0.04)
IMM GRANULOCYTES NFR BLD AUTO: 0 % (ref 0–0.5)
LYMPHOCYTES # BLD: 1.2 K/UL (ref 0.8–3.5)
LYMPHOCYTES NFR BLD: 25 % (ref 12–49)
MCH RBC QN AUTO: 29.4 PG (ref 26–34)
MCHC RBC AUTO-ENTMCNC: 33.5 G/DL (ref 30–36.5)
MCV RBC AUTO: 87.6 FL (ref 80–99)
MONOCYTES # BLD: 0.5 K/UL (ref 0–1)
MONOCYTES NFR BLD: 9 % (ref 5–13)
NEUTS SEG # BLD: 2.9 K/UL (ref 1.8–8)
NEUTS SEG NFR BLD: 63 % (ref 32–75)
NRBC # BLD: 0 K/UL (ref 0–0.01)
NRBC BLD-RTO: 0 PER 100 WBC
PLATELET # BLD AUTO: 224 K/UL (ref 150–400)
PMV BLD AUTO: 9.8 FL (ref 8.9–12.9)
POTASSIUM SERPL-SCNC: 4 MMOL/L (ref 3.5–5.1)
PROT SERPL-MCNC: 7 G/DL (ref 6.4–8.2)
RBC # BLD AUTO: 4.53 M/UL (ref 3.8–5.2)
SODIUM SERPL-SCNC: 139 MMOL/L (ref 136–145)
TSH SERPL DL<=0.05 MIU/L-ACNC: 2 UIU/ML (ref 0.36–3.74)
WBC # BLD AUTO: 4.8 K/UL (ref 3.6–11)

## 2024-04-17 ENCOUNTER — OFFICE VISIT (OUTPATIENT)
Age: 64
End: 2024-04-17
Payer: MEDICARE

## 2024-04-17 VITALS
DIASTOLIC BLOOD PRESSURE: 78 MMHG | BODY MASS INDEX: 32.18 KG/M2 | HEART RATE: 84 BPM | SYSTOLIC BLOOD PRESSURE: 120 MMHG | OXYGEN SATURATION: 96 % | HEIGHT: 67 IN | WEIGHT: 205 LBS

## 2024-04-17 DIAGNOSIS — E78.5 DYSLIPIDEMIA: ICD-10-CM

## 2024-04-17 DIAGNOSIS — R06.02 SOB (SHORTNESS OF BREATH): Primary | ICD-10-CM

## 2024-04-17 DIAGNOSIS — R07.9 CHEST PAIN, UNSPECIFIED TYPE: ICD-10-CM

## 2024-04-17 DIAGNOSIS — I77.9 CAROTID ARTERY DISEASE, UNSPECIFIED LATERALITY, UNSPECIFIED TYPE (HCC): ICD-10-CM

## 2024-04-17 DIAGNOSIS — G47.33 OSA (OBSTRUCTIVE SLEEP APNEA): ICD-10-CM

## 2024-04-17 PROCEDURE — 99204 OFFICE O/P NEW MOD 45 MIN: CPT | Performed by: SPECIALIST

## 2024-04-17 PROCEDURE — G8427 DOCREV CUR MEDS BY ELIG CLIN: HCPCS | Performed by: SPECIALIST

## 2024-04-17 PROCEDURE — 3017F COLORECTAL CA SCREEN DOC REV: CPT | Performed by: SPECIALIST

## 2024-04-17 PROCEDURE — 93005 ELECTROCARDIOGRAM TRACING: CPT | Performed by: SPECIALIST

## 2024-04-17 PROCEDURE — 1036F TOBACCO NON-USER: CPT | Performed by: SPECIALIST

## 2024-04-17 PROCEDURE — 93010 ELECTROCARDIOGRAM REPORT: CPT | Performed by: SPECIALIST

## 2024-04-17 PROCEDURE — G8417 CALC BMI ABV UP PARAM F/U: HCPCS | Performed by: SPECIALIST

## 2024-04-17 RX ORDER — METHYLPHENIDATE HYDROCHLORIDE 20 MG/1
TABLET ORAL
COMMUNITY
Start: 2024-03-28

## 2024-04-17 NOTE — PROGRESS NOTES
Cleve Dempsey MD. [facility]          Patient: Vonda Gtz  : 1960      Today's Date: 2024        HISTORY OF PRESENT ILLNESS:     History of Present Illness:    Has had RIVERA for months.  She has been getting SOB walking up an incline.   No exertional CP.    Has non-cardiac CP with fibromyalgia - pain on twisting.    No history of heart problems.          PAST MEDICAL HISTORY:     Past Medical History:   Diagnosis Date    DDD (degenerative disc disease), lumbar     Depression     Dyslipidemia     Fibromyalgia     Multinodular goiter     Tubular adenoma of colon     removed in , last colonoscopy in , Dr. Antoine Rogers       Past Surgical History:   Procedure Laterality Date    LUMBAR LAMINECTOMY      UPPER GASTROINTESTINAL ENDOSCOPY               CURRENT MEDICATIONS:    .  Current Outpatient Medications   Medication Sig Dispense Refill    methylphenidate (RITALIN) 20 MG tablet For driving to stay awake.      tiZANidine (ZANAFLEX) 4 MG tablet Take 1 tablet by mouth every 6 hours as needed      CALCIUM PO Take by mouth      cyclobenzaprine (FLEXERIL) 10 MG tablet Take 1 tablet by mouth 2 times daily as needed      morphine (MSIR) 15 MG tablet take 1 tablet by mouth twice a day if needed for EPISODE PAIN       No current facility-administered medications for this visit.       No Known Allergies      SOCIAL HISTORY:     Social History     Tobacco Use    Smoking status: Never    Smokeless tobacco: Never   Substance Use Topics    Alcohol use: No     Alcohol/week: 0.0 standard drinks of alcohol    Drug use: No         FAMILY HISTORY:     Family History   Problem Relation Age of Onset    Diabetes Mother     Colon Cancer Father          REVIEW OF SYMPTOMS:     Review of Symptoms:  Constitutional: Negative for fever, chills  HEENT: Negative for nosebleeds, tinnitus, and vision changes.   Respiratory: Negative for cough, wheezing  Cardiovascular: Negative for orthopnea, claudication,

## 2024-04-17 NOTE — PROGRESS NOTES
Chief Complaint   Patient presents with    New Patient    Shortness of Breath     Vitals:    04/17/24 1405   BP: 120/78   Site: Left Upper Arm   Position: Standing   Cuff Size: Large Adult   Pulse: 84   SpO2: 96%   Weight: 93 kg (205 lb)   Height: 1.702 m (5' 7\")       Chest pain NO   RIVERA worsening.    Fiber myalgia so unsure if CP is cardiac?  \"Extra-thoracic something\"    [facility] a number of  Dr. Butler for Hypotension.  Neuromediated Hypotension, eat lots of salt.  Gets fatigue when having bouts of low BP.    ER, urgent care, or hospitalized outside of [facility] since your last visit?  NO     Refills NO     [facility] screening prior to COVID, carotid artery?

## 2024-04-17 NOTE — PATIENT INSTRUCTIONS
[email or web address] and enter H967 to learn more about \"DASH Diet: Care Instructions.\"  Current as of: September 20, 2023               Content Version: 14.0  © 9885-5992 [employer].   Care instructions adapted under license by [employer]. If you have questions about a medical condition or this instruction, always ask your healthcare professional. [employer] disclaims any warranty or liability for your use of this information.

## 2024-06-18 ENCOUNTER — ANCILLARY PROCEDURE (OUTPATIENT)
Age: 64
End: 2024-06-18
Payer: MEDICARE

## 2024-06-18 VITALS
WEIGHT: 205 LBS | HEIGHT: 67 IN | SYSTOLIC BLOOD PRESSURE: 120 MMHG | HEART RATE: 67 BPM | DIASTOLIC BLOOD PRESSURE: 66 MMHG | BODY MASS INDEX: 32.18 KG/M2

## 2024-06-18 VITALS — BODY MASS INDEX: 32.18 KG/M2 | WEIGHT: 205 LBS | HEIGHT: 67 IN

## 2024-06-18 DIAGNOSIS — R07.9 CHEST PAIN, UNSPECIFIED TYPE: ICD-10-CM

## 2024-06-18 DIAGNOSIS — I77.9 CAROTID ARTERY DISEASE, UNSPECIFIED LATERALITY, UNSPECIFIED TYPE (HCC): ICD-10-CM

## 2024-06-18 DIAGNOSIS — R06.02 SOB (SHORTNESS OF BREATH): ICD-10-CM

## 2024-06-18 LAB
ECHO AO ASC DIAM: 3 CM
ECHO AO ASCENDING AORTA INDEX: 1.47 CM/M2
ECHO AO ROOT DIAM: 3.3 CM
ECHO AO ROOT INDEX: 1.62 CM/M2
ECHO AV AREA PEAK VELOCITY: 3.4 CM2
ECHO AV AREA VTI: 3.5 CM2
ECHO AV AREA/BSA PEAK VELOCITY: 1.7 CM2/M2
ECHO AV AREA/BSA VTI: 1.7 CM2/M2
ECHO AV MEAN GRADIENT: 3 MMHG
ECHO AV MEAN VELOCITY: 0.8 M/S
ECHO AV PEAK GRADIENT: 5 MMHG
ECHO AV PEAK VELOCITY: 1.1 M/S
ECHO AV VELOCITY RATIO: 1
ECHO AV VTI: 24.4 CM
ECHO BSA: 2.1 M2
ECHO EST RA PRESSURE: 3 MMHG
ECHO LA DIAMETER INDEX: 1.96 CM/M2
ECHO LA DIAMETER: 4 CM
ECHO LA TO AORTIC ROOT RATIO: 1.21
ECHO LA VOL A-L A2C: 63 ML (ref 22–52)
ECHO LA VOL A-L A4C: 72 ML (ref 22–52)
ECHO LA VOL MOD A2C: 59 ML (ref 22–52)
ECHO LA VOL MOD A4C: 68 ML (ref 22–52)
ECHO LA VOLUME AREA LENGTH: 71 ML
ECHO LA VOLUME INDEX A-L A2C: 31 ML/M2 (ref 16–34)
ECHO LA VOLUME INDEX A-L A4C: 35 ML/M2 (ref 16–34)
ECHO LA VOLUME INDEX AREA LENGTH: 35 ML/M2 (ref 16–34)
ECHO LA VOLUME INDEX MOD A2C: 29 ML/M2 (ref 16–34)
ECHO LA VOLUME INDEX MOD A4C: 33 ML/M2 (ref 16–34)
ECHO LV E' LATERAL VELOCITY: 11 CM/S
ECHO LV E' SEPTAL VELOCITY: 9 CM/S
ECHO LV EDV A2C: 99 ML
ECHO LV EDV A4C: 111 ML
ECHO LV EDV BP: 105 ML (ref 56–104)
ECHO LV EDV INDEX A4C: 54 ML/M2
ECHO LV EDV INDEX BP: 51 ML/M2
ECHO LV EDV NDEX A2C: 49 ML/M2
ECHO LV EJECTION FRACTION A2C: 54 %
ECHO LV EJECTION FRACTION A4C: 63 %
ECHO LV EJECTION FRACTION BIPLANE: 59 % (ref 55–100)
ECHO LV ESV A2C: 46 ML
ECHO LV ESV A4C: 41 ML
ECHO LV ESV BP: 43 ML (ref 19–49)
ECHO LV ESV INDEX A2C: 23 ML/M2
ECHO LV ESV INDEX A4C: 20 ML/M2
ECHO LV ESV INDEX BP: 21 ML/M2
ECHO LV FRACTIONAL SHORTENING: 31 % (ref 28–44)
ECHO LV INTERNAL DIMENSION DIASTOLE INDEX: 2.4 CM/M2
ECHO LV INTERNAL DIMENSION DIASTOLIC: 4.9 CM (ref 3.9–5.3)
ECHO LV INTERNAL DIMENSION SYSTOLIC INDEX: 1.67 CM/M2
ECHO LV INTERNAL DIMENSION SYSTOLIC: 3.4 CM
ECHO LV IVSD: 0.6 CM (ref 0.6–0.9)
ECHO LV MASS 2D: 91.6 G (ref 67–162)
ECHO LV MASS INDEX 2D: 44.9 G/M2 (ref 43–95)
ECHO LV POSTERIOR WALL DIASTOLIC: 0.6 CM (ref 0.6–0.9)
ECHO LV RELATIVE WALL THICKNESS RATIO: 0.24
ECHO LVOT AREA: 3.5 CM2
ECHO LVOT AV VTI INDEX: 1.01
ECHO LVOT DIAM: 2.1 CM
ECHO LVOT MEAN GRADIENT: 2 MMHG
ECHO LVOT PEAK GRADIENT: 5 MMHG
ECHO LVOT PEAK VELOCITY: 1.1 M/S
ECHO LVOT STROKE VOLUME INDEX: 41.9 ML/M2
ECHO LVOT SV: 85.5 ML
ECHO LVOT VTI: 24.7 CM
ECHO MV A VELOCITY: 0.68 M/S
ECHO MV AREA PHT: 3.3 CM2
ECHO MV AREA VTI: 3.5 CM2
ECHO MV E DECELERATION TIME (DT): 227.2 MS
ECHO MV E VELOCITY: 0.61 M/S
ECHO MV E/A RATIO: 0.9
ECHO MV E/E' LATERAL: 5.55
ECHO MV E/E' RATIO (AVERAGED): 6.16
ECHO MV E/E' SEPTAL: 6.78
ECHO MV LVOT VTI INDEX: 0.98
ECHO MV MAX VELOCITY: 0.9 M/S
ECHO MV MEAN GRADIENT: 1 MMHG
ECHO MV MEAN VELOCITY: 0.6 M/S
ECHO MV PEAK GRADIENT: 3 MMHG
ECHO MV PRESSURE HALF TIME (PHT): 65.9 MS
ECHO MV VTI: 24.1 CM
ECHO RA AREA 4C: 18 CM2
ECHO RA END SYSTOLIC VOLUME APICAL 4 CHAMBER INDEX BSA: 25 ML/M2
ECHO RA VOLUME: 51 ML
ECHO RIGHT VENTRICULAR SYSTOLIC PRESSURE (RVSP): 21 MMHG
ECHO RV INTERNAL DIMENSION: 4.1 CM
ECHO RV TAPSE: 2.3 CM (ref 1.7–?)
ECHO TV REGURGITANT MAX VELOCITY: 2.13 M/S
ECHO TV REGURGITANT PEAK GRADIENT: 18 MMHG
NUC STRESS EJECTION FRACTION: 74 %
STRESS ANGINA INDEX: 0
STRESS BASELINE DIAS BP: 80 MMHG
STRESS BASELINE HR: 70 BPM
STRESS BASELINE SYS BP: 138 MMHG
STRESS ESTIMATED WORKLOAD: 8.9 METS
STRESS EXERCISE DUR MIN: 7 MIN
STRESS EXERCISE DUR SEC: 10 SEC
STRESS O2 SAT PEAK: 97 %
STRESS O2 SAT REST: 99 %
STRESS PEAK DIAS BP: 70 MMHG
STRESS PEAK SYS BP: 202 MMHG
STRESS PERCENT HR ACHIEVED: 87 %
STRESS POST PEAK HR: 136 BPM
STRESS RATE PRESSURE PRODUCT: NORMAL BPM*MMHG
STRESS TARGET HR: 156 BPM
TID: 0.87
VAS LEFT CCA DIST EDV: 20.9 CM/S
VAS LEFT CCA DIST PSV: 99 CM/S
VAS LEFT CCA PROX EDV: 33.6 CM/S
VAS LEFT CCA PROX PSV: 124.2 CM/S
VAS LEFT ECA EDV: 13.86 CM/S
VAS LEFT ECA PSV: 77.7 CM/S
VAS LEFT ICA DIST EDV: 24.6 CM/S
VAS LEFT ICA DIST PSV: 69.7 CM/S
VAS LEFT ICA MID EDV: 19.2 CM/S
VAS LEFT ICA MID PSV: 83.6 CM/S
VAS LEFT ICA PROX EDV: 17.5 CM/S
VAS LEFT ICA PROX PSV: 67.7 CM/S
VAS LEFT ICA/CCA PSV: 0.67 NO UNITS
VAS LEFT VERTEBRAL EDV: 9.66 CM/S
VAS LEFT VERTEBRAL PSV: 45.6 CM/S
VAS RIGHT CCA DIST EDV: 22.1 CM/S
VAS RIGHT CCA DIST PSV: 82.3 CM/S
VAS RIGHT CCA PROX EDV: 17.4 CM/S
VAS RIGHT CCA PROX PSV: 79.7 CM/S
VAS RIGHT ECA EDV: 11.52 CM/S
VAS RIGHT ECA PSV: 80.5 CM/S
VAS RIGHT ICA DIST EDV: 44.2 CM/S
VAS RIGHT ICA DIST PSV: 109.4 CM/S
VAS RIGHT ICA MID EDV: 29.3 CM/S
VAS RIGHT ICA MID PSV: 68.4 CM/S
VAS RIGHT ICA PROX EDV: 23.9 CM/S
VAS RIGHT ICA PROX PSV: 84.1 CM/S
VAS RIGHT ICA/CCA PSV: 1.3 NO UNITS
VAS RIGHT VERTEBRAL EDV: 21.31 CM/S
VAS RIGHT VERTEBRAL PSV: 52.1 CM/S

## 2024-06-18 PROCEDURE — A9500 TC99M SESTAMIBI: HCPCS | Performed by: SPECIALIST

## 2024-06-18 PROCEDURE — 93306 TTE W/DOPPLER COMPLETE: CPT | Performed by: SPECIALIST

## 2024-06-18 PROCEDURE — 78452 HT MUSCLE IMAGE SPECT MULT: CPT | Performed by: SPECIALIST

## 2024-06-18 PROCEDURE — 93880 EXTRACRANIAL BILAT STUDY: CPT | Performed by: SPECIALIST

## 2024-06-18 RX ORDER — TETRAKIS(2-METHOXYISOBUTYLISOCYANIDE)COPPER(I) TETRAFLUOROBORATE 1 MG/ML
24.9 INJECTION, POWDER, LYOPHILIZED, FOR SOLUTION INTRAVENOUS
Status: COMPLETED | OUTPATIENT
Start: 2024-06-18 | End: 2024-06-18

## 2024-06-18 RX ORDER — TETRAKIS(2-METHOXYISOBUTYLISOCYANIDE)COPPER(I) TETRAFLUOROBORATE 1 MG/ML
8.8 INJECTION, POWDER, LYOPHILIZED, FOR SOLUTION INTRAVENOUS
Status: COMPLETED | OUTPATIENT
Start: 2024-06-18 | End: 2024-06-18

## 2024-06-18 RX ADMIN — TECHNETIUM TC-99M SESTAMIBI 8.8 MILLICURIE: 1 INJECTION INTRAVENOUS at 12:15

## 2024-06-18 RX ADMIN — TECHNETIUM TC-99M SESTAMIBI 24.9 MILLICURIE: 1 INJECTION INTRAVENOUS at 13:15

## 2024-06-20 ENCOUNTER — TELEPHONE (OUTPATIENT)
Age: 64
End: 2024-06-20

## 2024-06-20 NOTE — TELEPHONE ENCOUNTER
Per Dr. Cleve Dempsey: \"Can you please let her know that echo, carotid doppler and exercise cardiolite were normal.     Patient advised of most recent test results and patient voiced understanding.

## 2025-02-06 ENCOUNTER — HOSPITAL ENCOUNTER (OUTPATIENT)
Facility: HOSPITAL | Age: 65
Discharge: HOME | End: 2025-02-09
Payer: MEDICARE

## 2025-02-06 VITALS
HEIGHT: 68 IN | TEMPERATURE: 97.7 F | SYSTOLIC BLOOD PRESSURE: 118 MMHG | WEIGHT: 200.4 LBS | DIASTOLIC BLOOD PRESSURE: 77 MMHG | RESPIRATION RATE: 20 BRPM | HEART RATE: 68 BPM | BODY MASS INDEX: 30.37 KG/M2

## 2025-02-06 LAB
BASOPHILS # BLD: 0.04 K/UL (ref 0–0.1)
BASOPHILS NFR BLD: 0.8 % (ref 0–1)
DIFFERENTIAL METHOD BLD: NORMAL
EOSINOPHIL # BLD: 0.3 K/UL (ref 0–0.4)
EOSINOPHIL NFR BLD: 5.7 % (ref 0–7)
ERYTHROCYTE [DISTWIDTH] IN BLOOD BY AUTOMATED COUNT: 13.2 % (ref 11.5–14.5)
HCT VFR BLD AUTO: 39.6 % (ref 35–47)
HGB BLD-MCNC: 12.9 G/DL (ref 11.5–16)
IMM GRANULOCYTES # BLD AUTO: 0.01 K/UL (ref 0–0.04)
IMM GRANULOCYTES NFR BLD AUTO: 0.2 % (ref 0–0.5)
LYMPHOCYTES # BLD: 1.42 K/UL (ref 0.8–3.5)
LYMPHOCYTES NFR BLD: 27.2 % (ref 12–49)
MCH RBC QN AUTO: 28.7 PG (ref 26–34)
MCHC RBC AUTO-ENTMCNC: 32.6 G/DL (ref 30–36.5)
MCV RBC AUTO: 88 FL (ref 80–99)
MONOCYTES # BLD: 0.48 K/UL (ref 0–1)
MONOCYTES NFR BLD: 9.2 % (ref 5–13)
NEUTS SEG # BLD: 2.98 K/UL (ref 1.8–8)
NEUTS SEG NFR BLD: 56.9 % (ref 32–75)
NRBC # BLD: 0 K/UL (ref 0–0.01)
NRBC BLD-RTO: 0 PER 100 WBC
PLATELET # BLD AUTO: 219 K/UL (ref 150–400)
PMV BLD AUTO: 9.7 FL (ref 8.9–12.9)
RBC # BLD AUTO: 4.5 M/UL (ref 3.8–5.2)
WBC # BLD AUTO: 5.2 K/UL (ref 3.6–11)

## 2025-02-06 PROCEDURE — 36415 COLL VENOUS BLD VENIPUNCTURE: CPT

## 2025-02-06 PROCEDURE — 85025 COMPLETE CBC W/AUTO DIFF WBC: CPT

## 2025-02-06 RX ORDER — VITAMIN B COMPLEX
1 CAPSULE ORAL DAILY
COMMUNITY

## 2025-02-06 RX ORDER — MAGNESIUM GLUCONATE 27 MG(500)
500 TABLET ORAL DAILY
COMMUNITY

## 2025-02-06 ASSESSMENT — PAIN DESCRIPTION - ORIENTATION: ORIENTATION: LOWER;UPPER

## 2025-02-06 ASSESSMENT — PAIN - FUNCTIONAL ASSESSMENT: PAIN_FUNCTIONAL_ASSESSMENT: PREVENTS OR INTERFERES SOME ACTIVE ACTIVITIES AND ADLS

## 2025-02-06 ASSESSMENT — PAIN DESCRIPTION - DESCRIPTORS: DESCRIPTORS: SHOOTING

## 2025-02-06 ASSESSMENT — PAIN DESCRIPTION - LOCATION: LOCATION: BACK;LEG;NECK

## 2025-02-06 ASSESSMENT — PAIN SCALES - GENERAL: PAINLEVEL_OUTOF10: 4

## 2025-02-06 NOTE — PERIOP NOTE
INSTRUCTIONS GIVEN AND REVIEWED, 2 BOTTLES OF SOAP GIVEN, PT GIVEN TIME TO ASK QUESTIONS       
example, Diclofenac (Voltaren), Ibuprofen (Advil, Motrin), Naproxen (Aleve) 3 days STOP TAKING ON 02/10/25    STOP all herbal supplements and vitamins(unless prescribed by your doctor), and fish oil for 7 days STOP TAKING ON 02/06/25: CALCIUM,  MAGNESIUM,  VIT B COMPLEX, ZINC, VIT C, VIT D3    Other:NONE    (Pain medications not listed above, including Tylenol may be taken up until 4 hours prior to arrival time)   Blood  Thinners If you take Aspirin, Eliquis, Plavix, Coumadin, or any blood-thinning or anti-blood clot medicine, talk to the doctor who prescribed the medications for pre-operative instructions.    If you take aspirin or aspirin containing products for pain, stop 7 days prior to surgery STOP TAKING 02/06/25     Bathing Clothing  Jewelry  Valuables     When you shower the morning of surgery, please do not apply anything to your skin (lotions, powders, deodorant (if having breast surgery), or makeup, especially mascara). Remove fingernail polish except for clear.  Do not shave or trim anywhere 24 hours before surgery  Wear your hair loose or down; no pony-tails, buns, or metal hair clips  Wear loose, comfortable, clean clothes  Wear glasses instead of contacts. Bring a case to keep your glasses safe.  Leave money, valuables, and jewelry, including body piercings, at home  If you use inhalers or CPAP machine, bring it with you the day of surgery.     Going Home - or Spending the Night OUTPATIENT SURGERY: You must have a responsible adult drive you home and stay with you 24 hours after surgery. You may not drive for 24 hours after surgery.    ADMITS: If your doctor is keeping you in the hospital after surgery, leave personal belongings/luggage in your car until you have a hospital room number.    Hospital discharge time is 12 noon  Drivers must be here before 12 noon unless you are told differently   Special Instructions Free  parking available from 6 AM until 4:30 PM.    FOLLOW ALL INSTRUCTIONS GIVEN

## 2025-02-12 NOTE — DISCHARGE INSTRUCTIONS
Post-operative Instructions After Colpocleisis Surgery     Take Ibuprofen for moderate pain and the Oxycodone/Acetaminophen for more severe pain. The Oxycodone/Acetaminophen is a narcotic, so it causes constipation and drowsiness.    Your bottom will feel very sore for a few days, and may feel worse with sitting. Ice packs can help.     Take daily Miralax and docusate for at least 1 week to keep the stool soft and avoid constipation, as constipation can negatively affect the healing tissue.    You can take as shower the day after surgery; please avoid baths.    You will have vaginal bleeding for a few days; it should turn into light brown blood that may be present for a couple weeks. Call if you are soaking a pad each hour.  You may begin to have a small discharge as your stitches dissolve. Call if you have any pus-appearing, foul smelling discharge.     You can walk as much as you'd like, just avoid lifting more than 25lb for 4-6 weeks.    Call [facility]'s [place] (daytime: [phone] or after hours: [phone]) if you have pain that is not controlled with pain medications, excessive vaginal bleeding, inability to empty your bladder, shortness of breath, chest pain, or any other concerning symptoms.      ______________________________________________________________________    Anesthesia Discharge Instructions    After general anesthesia or intervenous sedation, for 24 hours or while taking prescription Narcotics:  Limit your activities  Do not drive or operate hazardous machinery  If you have not urinated within 8 hours after discharge, please contact your surgeon on call.  Do not make important personal or business decisions  Do not drink alcoholic beverages    Report the following to your surgeon:  Excessive pain, swelling, redness or odor of or around the surgical area  Temperature over 100.5 degrees  Nausea and vomiting lasting longer than 4 hours or if unable to take medication  Any signs of decreased

## 2025-02-12 NOTE — H&P
removed, clearned, replaced with string.    Skin: Inspection and palpation: no rash or lesions.  Procedure Documentation  Chaperoned Pelvic Examination:  Pelvic examination performed. Chaperone present  See pelvic examination documentation for specific findings.  Assessment / Plan  1. Prolapse of female genital organs -  Stage 3 anterior/apical POP managed with #8 ring w/ support  Not sexually active    + stress test    12-13-24 TVUS 3.31x3.97x2.57cm with ES 4.11mm    Risks of surgery including bleeding, need for blood transfusion, infection, damage to nearby organs, voiding dysfunction and risk of recurrence reviewed. Details of procedures and postoperative recovery plan including limitations on activities and lifting reviewed. Patient agrees to proceed with [place] colpocleisis, cystoscopy, TVT midurethral sling (mesh shown)    Declines oxycodone, will use OTC meds.    REMOVE PESSAY 5 days prior to surgery, floss applied  N81.9: Female genital prolapse, unspecified    2. Female stress incontinence -  + 300cc stress test 2024  mesh hesitant but after discussion of mini vs full length sling, wants most studied durable sling. Will do TVT miduethral sling.  N39.3: Stress incontinence (female) (male)    3. Screening for malignant neoplasm of cervix -  last PAP normal in 2015  will do one more  erosion on posterior lip of cervix    40-54 ([id number]) minutes of total time were spent preparing to see the patient, obtaining and/or reviewing history, counseling and educating the patient/family/caregiver, ordering medications/tests/procedures, referring/communicating with other health care providers, documenting clinical information in electronic or other health record, interpreting/communicating results, and care coordination.  Z12.4: Encounter for screening for malignant neoplasm of cervix  IGP, APTIMA HPV, RFX [id number] -       Specimen source: Cervix    Update History & Physical    The patient's History and Physical

## 2025-02-13 ENCOUNTER — ANESTHESIA EVENT (OUTPATIENT)
Facility: HOSPITAL | Age: 65
End: 2025-02-13
Payer: MEDICARE

## 2025-02-13 ENCOUNTER — HOSPITAL ENCOUNTER (OUTPATIENT)
Facility: HOSPITAL | Age: 65
Setting detail: OUTPATIENT SURGERY
Discharge: HOME | End: 2025-02-13
Attending: OBSTETRICS & GYNECOLOGY | Admitting: OBSTETRICS & GYNECOLOGY
Payer: MEDICARE

## 2025-02-13 ENCOUNTER — ANESTHESIA (OUTPATIENT)
Facility: HOSPITAL | Age: 65
End: 2025-02-13
Payer: MEDICARE

## 2025-02-13 VITALS
HEIGHT: 68 IN | OXYGEN SATURATION: 94 % | HEART RATE: 77 BPM | WEIGHT: 200 LBS | RESPIRATION RATE: 20 BRPM | SYSTOLIC BLOOD PRESSURE: 120 MMHG | DIASTOLIC BLOOD PRESSURE: 67 MMHG | TEMPERATURE: 97.3 F | BODY MASS INDEX: 30.31 KG/M2

## 2025-02-13 DIAGNOSIS — N81.10 PELVIC ORGAN PROLAPSE QUANTIFICATION STAGE 3 CYSTOCELE: Primary | ICD-10-CM

## 2025-02-13 PROBLEM — N39.3 PRIMARY STRESS URINARY INCONTINENCE: Status: ACTIVE | Noted: 2025-02-13

## 2025-02-13 PROCEDURE — 3600000003 HC SURGERY LEVEL 3 BASE: Performed by: OBSTETRICS & GYNECOLOGY

## 2025-02-13 PROCEDURE — 6360000002 HC RX W HCPCS: Performed by: OBSTETRICS & GYNECOLOGY

## 2025-02-13 PROCEDURE — 3600000013 HC SURGERY LEVEL 3 ADDTL 15MIN: Performed by: OBSTETRICS & GYNECOLOGY

## 2025-02-13 PROCEDURE — 7100000000 HC PACU RECOVERY - FIRST 15 MIN: Performed by: OBSTETRICS & GYNECOLOGY

## 2025-02-13 PROCEDURE — 2580000003 HC RX 258

## 2025-02-13 PROCEDURE — 2500000003 HC RX 250 WO HCPCS: Performed by: OBSTETRICS & GYNECOLOGY

## 2025-02-13 PROCEDURE — C1713 ANCHOR/SCREW BN/BN,TIS/BN: HCPCS | Performed by: OBSTETRICS & GYNECOLOGY

## 2025-02-13 PROCEDURE — C1771 REP DEV, URINARY, W/SLING: HCPCS | Performed by: OBSTETRICS & GYNECOLOGY

## 2025-02-13 PROCEDURE — 6360000002 HC RX W HCPCS: Performed by: ANESTHESIOLOGY

## 2025-02-13 PROCEDURE — 3700000001 HC ADD 15 MINUTES (ANESTHESIA): Performed by: OBSTETRICS & GYNECOLOGY

## 2025-02-13 PROCEDURE — 7100000011 HC PHASE II RECOVERY - ADDTL 15 MIN: Performed by: OBSTETRICS & GYNECOLOGY

## 2025-02-13 PROCEDURE — 2709999900 HC NON-CHARGEABLE SUPPLY: Performed by: OBSTETRICS & GYNECOLOGY

## 2025-02-13 PROCEDURE — 3700000000 HC ANESTHESIA ATTENDED CARE: Performed by: OBSTETRICS & GYNECOLOGY

## 2025-02-13 PROCEDURE — 6370000000 HC RX 637 (ALT 250 FOR IP): Performed by: OBSTETRICS & GYNECOLOGY

## 2025-02-13 PROCEDURE — 7100000001 HC PACU RECOVERY - ADDTL 15 MIN: Performed by: OBSTETRICS & GYNECOLOGY

## 2025-02-13 PROCEDURE — 6360000002 HC RX W HCPCS

## 2025-02-13 PROCEDURE — 7100000010 HC PHASE II RECOVERY - FIRST 15 MIN: Performed by: OBSTETRICS & GYNECOLOGY

## 2025-02-13 PROCEDURE — 6370000000 HC RX 637 (ALT 250 FOR IP): Performed by: ANESTHESIOLOGY

## 2025-02-13 DEVICE — CONTINENCE SYSTEM
Type: IMPLANTABLE DEVICE | Site: VAGINA | Status: FUNCTIONAL
Brand: GYNECARE TVT EXACT

## 2025-02-13 RX ORDER — MIDAZOLAM HYDROCHLORIDE 2 MG/2ML
2 INJECTION, SOLUTION INTRAMUSCULAR; INTRAVENOUS
Status: DISCONTINUED | OUTPATIENT
Start: 2025-02-13 | End: 2025-02-13 | Stop reason: HOSPADM

## 2025-02-13 RX ORDER — HYDROMORPHONE HYDROCHLORIDE 1 MG/ML
0.5 INJECTION, SOLUTION INTRAMUSCULAR; INTRAVENOUS; SUBCUTANEOUS EVERY 5 MIN PRN
Status: COMPLETED | OUTPATIENT
Start: 2025-02-13 | End: 2025-02-13

## 2025-02-13 RX ORDER — OXYCODONE HYDROCHLORIDE 5 MG/1
5 TABLET ORAL
Status: DISCONTINUED | OUTPATIENT
Start: 2025-02-13 | End: 2025-02-13 | Stop reason: HOSPADM

## 2025-02-13 RX ORDER — ONDANSETRON 2 MG/ML
INJECTION INTRAMUSCULAR; INTRAVENOUS
Status: DISCONTINUED | OUTPATIENT
Start: 2025-02-13 | End: 2025-02-13 | Stop reason: SDUPTHER

## 2025-02-13 RX ORDER — SODIUM CHLORIDE, SODIUM LACTATE, POTASSIUM CHLORIDE, CALCIUM CHLORIDE 600; 310; 30; 20 MG/100ML; MG/100ML; MG/100ML; MG/100ML
INJECTION, SOLUTION INTRAVENOUS CONTINUOUS
Status: DISCONTINUED | OUTPATIENT
Start: 2025-02-13 | End: 2025-02-13 | Stop reason: HOSPADM

## 2025-02-13 RX ORDER — FENTANYL CITRATE 50 UG/ML
25 INJECTION, SOLUTION INTRAMUSCULAR; INTRAVENOUS PRN
Status: DISCONTINUED | OUTPATIENT
Start: 2025-02-13 | End: 2025-02-13 | Stop reason: HOSPADM

## 2025-02-13 RX ORDER — KETOROLAC TROMETHAMINE 30 MG/ML
15 INJECTION, SOLUTION INTRAMUSCULAR; INTRAVENOUS ONCE
Status: COMPLETED | OUTPATIENT
Start: 2025-02-13 | End: 2025-02-13

## 2025-02-13 RX ORDER — LIDOCAINE HYDROCHLORIDE 10 MG/ML
1 INJECTION, SOLUTION EPIDURAL; INFILTRATION; INTRACAUDAL; PERINEURAL
Status: DISCONTINUED | OUTPATIENT
Start: 2025-02-13 | End: 2025-02-13 | Stop reason: HOSPADM

## 2025-02-13 RX ORDER — SODIUM CHLORIDE 9 MG/ML
INJECTION, SOLUTION INTRAVENOUS PRN
Status: DISCONTINUED | OUTPATIENT
Start: 2025-02-13 | End: 2025-02-13 | Stop reason: HOSPADM

## 2025-02-13 RX ORDER — PHENAZOPYRIDINE HYDROCHLORIDE 100 MG/1
200 TABLET, FILM COATED ORAL ONCE
Status: COMPLETED | OUTPATIENT
Start: 2025-02-13 | End: 2025-02-13

## 2025-02-13 RX ORDER — DEXAMETHASONE SODIUM PHOSPHATE 4 MG/ML
INJECTION, SOLUTION INTRA-ARTICULAR; INTRALESIONAL; INTRAMUSCULAR; INTRAVENOUS; SOFT TISSUE
Status: DISCONTINUED | OUTPATIENT
Start: 2025-02-13 | End: 2025-02-13 | Stop reason: SDUPTHER

## 2025-02-13 RX ORDER — FENTANYL CITRATE 50 UG/ML
50 INJECTION, SOLUTION INTRAMUSCULAR; INTRAVENOUS PRN
Status: DISCONTINUED | OUTPATIENT
Start: 2025-02-13 | End: 2025-02-13 | Stop reason: HOSPADM

## 2025-02-13 RX ORDER — LIDOCAINE HYDROCHLORIDE 20 MG/ML
INJECTION, SOLUTION EPIDURAL; INFILTRATION; INTRACAUDAL; PERINEURAL
Status: DISCONTINUED | OUTPATIENT
Start: 2025-02-13 | End: 2025-02-13 | Stop reason: SDUPTHER

## 2025-02-13 RX ORDER — PHENAZOPYRIDINE HYDROCHLORIDE 100 MG/1
200 TABLET, FILM COATED ORAL ONCE
Status: DISCONTINUED | OUTPATIENT
Start: 2025-02-13 | End: 2025-02-13 | Stop reason: SDUPTHER

## 2025-02-13 RX ORDER — SODIUM CHLORIDE 0.9 % (FLUSH) 0.9 %
5-40 SYRINGE (ML) INJECTION EVERY 12 HOURS SCHEDULED
Status: DISCONTINUED | OUTPATIENT
Start: 2025-02-13 | End: 2025-02-13 | Stop reason: HOSPADM

## 2025-02-13 RX ORDER — SODIUM CHLORIDE, SODIUM LACTATE, POTASSIUM CHLORIDE, CALCIUM CHLORIDE 600; 310; 30; 20 MG/100ML; MG/100ML; MG/100ML; MG/100ML
INJECTION, SOLUTION INTRAVENOUS
Status: DISCONTINUED | OUTPATIENT
Start: 2025-02-13 | End: 2025-02-13 | Stop reason: SDUPTHER

## 2025-02-13 RX ORDER — MIDAZOLAM HYDROCHLORIDE 1 MG/ML
INJECTION, SOLUTION INTRAMUSCULAR; INTRAVENOUS
Status: DISCONTINUED | OUTPATIENT
Start: 2025-02-13 | End: 2025-02-13 | Stop reason: SDUPTHER

## 2025-02-13 RX ORDER — SODIUM CHLORIDE 0.9 % (FLUSH) 0.9 %
5-40 SYRINGE (ML) INJECTION PRN
Status: DISCONTINUED | OUTPATIENT
Start: 2025-02-13 | End: 2025-02-13 | Stop reason: HOSPADM

## 2025-02-13 RX ORDER — DIPHENHYDRAMINE HYDROCHLORIDE 50 MG/ML
12.5 INJECTION INTRAMUSCULAR; INTRAVENOUS
Status: DISCONTINUED | OUTPATIENT
Start: 2025-02-13 | End: 2025-02-13 | Stop reason: HOSPADM

## 2025-02-13 RX ORDER — MIDAZOLAM HYDROCHLORIDE 2 MG/2ML
2 INJECTION, SOLUTION INTRAMUSCULAR; INTRAVENOUS PRN
Status: DISCONTINUED | OUTPATIENT
Start: 2025-02-13 | End: 2025-02-13 | Stop reason: HOSPADM

## 2025-02-13 RX ORDER — ACETAMINOPHEN 325 MG/1
650 TABLET ORAL ONCE
Status: COMPLETED | OUTPATIENT
Start: 2025-02-13 | End: 2025-02-13

## 2025-02-13 RX ORDER — PROCHLORPERAZINE EDISYLATE 5 MG/ML
5 INJECTION INTRAMUSCULAR; INTRAVENOUS
Status: DISCONTINUED | OUTPATIENT
Start: 2025-02-13 | End: 2025-02-13 | Stop reason: HOSPADM

## 2025-02-13 RX ORDER — ONDANSETRON 2 MG/ML
4 INJECTION INTRAMUSCULAR; INTRAVENOUS
Status: DISCONTINUED | OUTPATIENT
Start: 2025-02-13 | End: 2025-02-13 | Stop reason: HOSPADM

## 2025-02-13 RX ORDER — FENTANYL CITRATE 50 UG/ML
25 INJECTION, SOLUTION INTRAMUSCULAR; INTRAVENOUS EVERY 5 MIN PRN
Status: DISCONTINUED | OUTPATIENT
Start: 2025-02-13 | End: 2025-02-13 | Stop reason: HOSPADM

## 2025-02-13 RX ORDER — SODIUM CHLORIDE 9 MG/ML
INJECTION, SOLUTION INTRAVENOUS CONTINUOUS
Status: DISCONTINUED | OUTPATIENT
Start: 2025-02-13 | End: 2025-02-13 | Stop reason: HOSPADM

## 2025-02-13 RX ORDER — FENTANYL CITRATE 50 UG/ML
INJECTION, SOLUTION INTRAMUSCULAR; INTRAVENOUS
Status: DISCONTINUED | OUTPATIENT
Start: 2025-02-13 | End: 2025-02-13 | Stop reason: SDUPTHER

## 2025-02-13 RX ORDER — PROPOFOL 10 MG/ML
INJECTION, EMULSION INTRAVENOUS
Status: DISCONTINUED | OUTPATIENT
Start: 2025-02-13 | End: 2025-02-13 | Stop reason: SDUPTHER

## 2025-02-13 RX ORDER — BUPIVACAINE HYDROCHLORIDE AND EPINEPHRINE 2.5; 5 MG/ML; UG/ML
INJECTION, SOLUTION EPIDURAL; INFILTRATION; INTRACAUDAL; PERINEURAL PRN
Status: DISCONTINUED | OUTPATIENT
Start: 2025-02-13 | End: 2025-02-13 | Stop reason: HOSPADM

## 2025-02-13 RX ORDER — PHENYLEPHRINE HCL IN 0.9% NACL 0.4MG/10ML
SYRINGE (ML) INTRAVENOUS
Status: DISCONTINUED | OUTPATIENT
Start: 2025-02-13 | End: 2025-02-13 | Stop reason: SDUPTHER

## 2025-02-13 RX ORDER — MEPERIDINE HYDROCHLORIDE 25 MG/ML
12.5 INJECTION INTRAMUSCULAR; INTRAVENOUS; SUBCUTANEOUS EVERY 5 MIN PRN
Status: DISCONTINUED | OUTPATIENT
Start: 2025-02-13 | End: 2025-02-13 | Stop reason: HOSPADM

## 2025-02-13 RX ORDER — LABETALOL HYDROCHLORIDE 5 MG/ML
10 INJECTION, SOLUTION INTRAVENOUS
Status: DISCONTINUED | OUTPATIENT
Start: 2025-02-13 | End: 2025-02-13 | Stop reason: HOSPADM

## 2025-02-13 RX ORDER — NALOXONE HYDROCHLORIDE 0.4 MG/ML
INJECTION, SOLUTION INTRAMUSCULAR; INTRAVENOUS; SUBCUTANEOUS PRN
Status: DISCONTINUED | OUTPATIENT
Start: 2025-02-13 | End: 2025-02-13 | Stop reason: HOSPADM

## 2025-02-13 RX ORDER — OXYCODONE HYDROCHLORIDE 5 MG/1
5 TABLET ORAL EVERY 6 HOURS PRN
Qty: 6 TABLET | Refills: 0 | Status: SHIPPED | OUTPATIENT
Start: 2025-02-13 | End: 2025-02-16

## 2025-02-13 RX ADMIN — SODIUM CHLORIDE, POTASSIUM CHLORIDE, SODIUM LACTATE AND CALCIUM CHLORIDE: 600; 310; 30; 20 INJECTION, SOLUTION INTRAVENOUS at 13:17

## 2025-02-13 RX ADMIN — PHENYLEPHRINE HYDROCHLORIDE 60 MCG/MIN: 10 INJECTION INTRAVENOUS at 12:21

## 2025-02-13 RX ADMIN — PROPOFOL 50 MG: 10 INJECTION, EMULSION INTRAVENOUS at 11:56

## 2025-02-13 RX ADMIN — LIDOCAINE HYDROCHLORIDE 80 MG: 20 INJECTION, SOLUTION EPIDURAL; INFILTRATION; INTRACAUDAL; PERINEURAL at 11:51

## 2025-02-13 RX ADMIN — Medication 40 MCG: at 12:08

## 2025-02-13 RX ADMIN — Medication 80 MCG: at 12:21

## 2025-02-13 RX ADMIN — HYDROMORPHONE HYDROCHLORIDE 0.5 MG: 1 INJECTION, SOLUTION INTRAMUSCULAR; INTRAVENOUS; SUBCUTANEOUS at 14:01

## 2025-02-13 RX ADMIN — PROPOFOL 50 MG: 10 INJECTION, EMULSION INTRAVENOUS at 11:53

## 2025-02-13 RX ADMIN — FENTANYL CITRATE 25 MCG: 50 INJECTION INTRAMUSCULAR; INTRAVENOUS at 12:00

## 2025-02-13 RX ADMIN — SODIUM CHLORIDE, POTASSIUM CHLORIDE, SODIUM LACTATE AND CALCIUM CHLORIDE: 600; 310; 30; 20 INJECTION, SOLUTION INTRAVENOUS at 11:46

## 2025-02-13 RX ADMIN — MIDAZOLAM 2 MG: 1 INJECTION INTRAMUSCULAR; INTRAVENOUS at 11:46

## 2025-02-13 RX ADMIN — Medication 80 MCG: at 12:14

## 2025-02-13 RX ADMIN — DEXAMETHASONE SODIUM PHOSPHATE 4 MG: 4 INJECTION, SOLUTION INTRAMUSCULAR; INTRAVENOUS at 12:07

## 2025-02-13 RX ADMIN — PROPOFOL 50 MG: 10 INJECTION, EMULSION INTRAVENOUS at 12:00

## 2025-02-13 RX ADMIN — HYDROMORPHONE HYDROCHLORIDE 0.5 MG: 1 INJECTION, SOLUTION INTRAMUSCULAR; INTRAVENOUS; SUBCUTANEOUS at 13:57

## 2025-02-13 RX ADMIN — FENTANYL CITRATE 50 MCG: 50 INJECTION INTRAMUSCULAR; INTRAVENOUS at 12:41

## 2025-02-13 RX ADMIN — FENTANYL CITRATE 25 MCG: 50 INJECTION INTRAMUSCULAR; INTRAVENOUS at 14:38

## 2025-02-13 RX ADMIN — PROPOFOL 30 MG: 10 INJECTION, EMULSION INTRAVENOUS at 13:17

## 2025-02-13 RX ADMIN — ONDANSETRON 4 MG: 2 INJECTION INTRAMUSCULAR; INTRAVENOUS at 12:21

## 2025-02-13 RX ADMIN — PROPOFOL 150 MG: 10 INJECTION, EMULSION INTRAVENOUS at 11:51

## 2025-02-13 RX ADMIN — Medication 40 MCG: at 12:09

## 2025-02-13 RX ADMIN — ACETAMINOPHEN 650 MG: 325 TABLET ORAL at 11:11

## 2025-02-13 RX ADMIN — PROPOFOL 30 MG: 10 INJECTION, EMULSION INTRAVENOUS at 13:13

## 2025-02-13 RX ADMIN — PHENAZOPYRIDINE HYDROCHLORIDE 200 MG: 100 TABLET ORAL at 11:11

## 2025-02-13 RX ADMIN — KETOROLAC TROMETHAMINE 15 MG: 30 INJECTION, SOLUTION INTRAMUSCULAR; INTRAVENOUS at 14:48

## 2025-02-13 RX ADMIN — WATER 2000 MG: 1 INJECTION INTRAMUSCULAR; INTRAVENOUS; SUBCUTANEOUS at 12:01

## 2025-02-13 RX ADMIN — FENTANYL CITRATE 25 MCG: 50 INJECTION INTRAMUSCULAR; INTRAVENOUS at 11:57

## 2025-02-13 RX ADMIN — FENTANYL CITRATE 25 MCG: 50 INJECTION INTRAMUSCULAR; INTRAVENOUS at 14:33

## 2025-02-13 ASSESSMENT — PAIN DESCRIPTION - DESCRIPTORS: DESCRIPTORS: PRESSURE;ACHING

## 2025-02-13 ASSESSMENT — PAIN - FUNCTIONAL ASSESSMENT: PAIN_FUNCTIONAL_ASSESSMENT: 0-10

## 2025-02-13 NOTE — ANESTHESIA PRE PROCEDURE
Department of Anesthesiology  Preprocedure Note       Name:  Vonda Gtz   Age:  64 y.o.  :  1960                                          MRN:  [id number]         Date:  2025      Surgeon: Surgeon(s):  Marta Klein MD    Procedure: Procedure(s):  LEFORT COLPOCLEISIS, CYSTOSCOPY, TENSION FREE VAGINAL TAPE MIDURETHRAL SLING (LMA)    Medications prior to admission:   Prior to Admission medications    Medication Sig Start Date End Date Taking? Authorizing Provider   ZINC PO Take 1 tablet by mouth daily    Casandra Osorio MD   Cholecalciferol (VITAMIN D-3 PO) Take 1 tablet by mouth daily    Casandra Osorio MD   magnesium gluconate (MAGONATE) 500 MG tablet Take 1 tablet by mouth daily    Casandra Osorio MD   b complex vitamins capsule Take 1 capsule by mouth daily    Casandra Osorio MD   Ascorbic Acid (VITAMIN C PO) Take 1 tablet by mouth daily    Casandra Osorio MD   tiZANidine (ZANAFLEX) 4 MG tablet Take 1 tablet by mouth every 6 hours as needed 3/28/24   Casandra Osorio MD   CALCIUM PO Take 1 tablet by mouth daily    Automatic Reconciliation, Ar   cyclobenzaprine (FLEXERIL) 10 MG tablet Take 1 tablet by mouth 2 times daily as needed 18   Automatic Reconciliation, Ar   morphine (MSIR) 15 MG tablet take 1 tablet by mouth twice a day if needed for EPISODE PAIN 22   Automatic Reconciliation, Ar       Current medications:    No current facility-administered medications for this encounter.       Allergies:  No Known Allergies    Problem List:    Patient Active Problem List   Diagnosis Code   • Mixed hyperlipidemia E78.2   • Fibromyalgia M79.7   • Depression F32.A   • Multinodular goiter E04.2   • Tubular adenoma of colon D12.6   • Prediabetes R73.03       Past Medical History:        Diagnosis Date   • Anesthesia complication     WOKE UP DURING COLONOSCOPY   • DDD (degenerative disc disease), lumbar    • Depression    • Dyslipidemia    • Fibromyalgia    • IBS

## 2025-02-13 NOTE — ANESTHESIA POSTPROCEDURE EVALUATION
Post-Anesthesia Evaluation and Assessment    Patient: Vonda Gtz MRN: [id number]  SSN: [id number]    YOB: 1960  Age: 64 y.o.  Sex: female      I have evaluated the patient and they are stable and ready for discharge from the PACU.     Cardiovascular Function/Vital Signs  Visit Vitals  /74   Pulse 75   Temp 97.5 °F (36.4 °C) (Oral)   Resp 14   Ht 1.727 m (5' 8\")   Wt 90.7 kg (200 lb)   SpO2 96%   BMI 30.41 kg/m²       Patient is status post Other anesthesia for Procedure(s):  LEFORT COLPOCLEISIS, CYSTOSCOPY, TENSION FREE VAGINAL TAPE MIDURETHRAL SLING (LMA).    Nausea/Vomiting: None    Postoperative hydration reviewed and adequate.    Pain:      Managed    Neurological Status:       At baseline    Mental Status, Level of Consciousness: Alert and  oriented to person, place, and time    Pulmonary Status:       Adequate oxygenation and airway patent    Complications related to anesthesia: None    Post-anesthesia assessment completed. No concerns    Signed By: Juan Pablo Shen MD     February 13, 2025

## 2025-02-13 NOTE — PERIOP NOTE
1405: Discharge instructions reviewed and provided to patient and patient's . All questions answered.

## 2025-02-13 NOTE — OP NOTE
Operative Note      Patient: Vonda Gtz  YOB: 1960  MRN: [id number]    Date of Procedure: 2/13/2025    Pre-Op Diagnosis Codes:      * Female genital prolapse, unspecified type [N81.9]     * Stress incontinence [N39.3]    Post-Op Diagnosis: Same       Procedure(s):  LEFORT COLPOCLEISIS, CYSTOSCOPY, TENSION FREE VAGINAL TAPE MIDURETHRAL SLING (LMA)    Surgeon(s):  Marta Klein MD    Assistant:   * No surgical staff found *    Anesthesia: Other    Estimated Blood Loss (mL): 100cc    Urine output: 200cc, pyridium stained     Complications: None    Specimens:   * No specimens in log *    Implants:  Implant Name Type Inv. Item Serial No.  Lot No. LRB No. Used Action   SLING INCONT RETROPUBIC CONTINENCE SYS GYNECARE TVT EXACT - SN/A  SLING INCONT RETROPUBIC CONTINENCE SYS GYNECARE TVT EXACT N/A [facility] [id number] N/A 1 Implanted         Drains:   Urinary Catheter 02/13/25 2 Way;[place] (Active)       Findings:  Infection Present At Time Of Surgery (PATOS) (choose all levels that have infection present):  No infection present  Other Findings: stage 3 anterior/apical predominant prolapse with large erosion/granulation tissue on posterior cervix, normal appearing bladder with bilaterally effluxing ureters, no foreign body in bladder, normal rectal exam     Detailed Description of Procedure:  Patient was taken to the OR and placed on the OR table in supine position.  She was given a general anesthetic.  She was then repositioned in supportive yellowfin stir-ups with care taken to safety positioning all 4 extremities.  She was prepped and draped in the usual sterile fashion.     A Lonestar retractor was placed for good visualization. The cervix was held with an Allis. I then began the colpocleisis.  0.25% Marcaine with epinephrine was injected under the vaginal epithelium for hemostasis and hydrodissection. Then, a large rectangular piece of vaginal skin was sharply dissected from the

## 2025-02-13 NOTE — PERIOP NOTE
FLOSEAL 10mL WAS GIVEN TO THE STERILE FIELD TO BE USED BY MD  REF: [id number]  LOT: [id number]  EXP: 04/30/2026

## 2025-02-24 ENCOUNTER — COMMUNITY OUTREACH (OUTPATIENT)
Facility: CLINIC | Age: 65
End: 2025-02-24

## 2025-02-24 NOTE — PROGRESS NOTES
Patient's HM shows they are overdue for Colorectal Screening.   Care Everywhere and  files searched.  No results to attach to order nor HM updated.      Patient's HM shows they are overdue for Mammogram.  Care Everywhere and  files searched.  No results to attach to order nor HM updated.       Wheelchair/Stroller

## 2025-03-26 ENCOUNTER — TELEPHONE (OUTPATIENT)
Facility: CLINIC | Age: 65
End: 2025-03-26

## 2025-04-07 ENCOUNTER — OFFICE VISIT (OUTPATIENT)
Facility: CLINIC | Age: 65
End: 2025-04-07
Payer: MEDICARE

## 2025-04-07 VITALS
WEIGHT: 195 LBS | RESPIRATION RATE: 20 BRPM | BODY MASS INDEX: 29.55 KG/M2 | TEMPERATURE: 97.8 F | SYSTOLIC BLOOD PRESSURE: 102 MMHG | OXYGEN SATURATION: 97 % | HEART RATE: 74 BPM | HEIGHT: 68 IN | DIASTOLIC BLOOD PRESSURE: 70 MMHG

## 2025-04-07 DIAGNOSIS — M54.50 CHRONIC MIDLINE LOW BACK PAIN WITHOUT SCIATICA: Primary | ICD-10-CM

## 2025-04-07 DIAGNOSIS — S61.210A LACERATION OF RIGHT INDEX FINGER WITHOUT FOREIGN BODY WITHOUT DAMAGE TO NAIL, INITIAL ENCOUNTER: ICD-10-CM

## 2025-04-07 DIAGNOSIS — Z13.9 ENCOUNTER FOR SCREENING INVOLVING SOCIAL DETERMINANTS OF HEALTH (SDOH): ICD-10-CM

## 2025-04-07 DIAGNOSIS — G89.29 CHRONIC MIDLINE LOW BACK PAIN WITHOUT SCIATICA: Primary | ICD-10-CM

## 2025-04-07 DIAGNOSIS — Z71.89 ADVANCED CARE PLANNING/COUNSELING DISCUSSION: ICD-10-CM

## 2025-04-07 PROCEDURE — 1036F TOBACCO NON-USER: CPT | Performed by: PHYSICIAN ASSISTANT

## 2025-04-07 PROCEDURE — 99214 OFFICE O/P EST MOD 30 MIN: CPT | Performed by: PHYSICIAN ASSISTANT

## 2025-04-07 PROCEDURE — G8400 PT W/DXA NO RESULTS DOC: HCPCS | Performed by: PHYSICIAN ASSISTANT

## 2025-04-07 PROCEDURE — G8427 DOCREV CUR MEDS BY ELIG CLIN: HCPCS | Performed by: PHYSICIAN ASSISTANT

## 2025-04-07 PROCEDURE — 1123F ACP DISCUSS/DSCN MKR DOCD: CPT | Performed by: PHYSICIAN ASSISTANT

## 2025-04-07 PROCEDURE — 1090F PRES/ABSN URINE INCON ASSESS: CPT | Performed by: PHYSICIAN ASSISTANT

## 2025-04-07 PROCEDURE — 3017F COLORECTAL CA SCREEN DOC REV: CPT | Performed by: PHYSICIAN ASSISTANT

## 2025-04-07 PROCEDURE — G8417 CALC BMI ABV UP PARAM F/U: HCPCS | Performed by: PHYSICIAN ASSISTANT

## 2025-04-07 SDOH — ECONOMIC STABILITY: FOOD INSECURITY: WITHIN THE PAST 12 MONTHS, YOU WORRIED THAT YOUR FOOD WOULD RUN OUT BEFORE YOU GOT MONEY TO BUY MORE.: NEVER TRUE

## 2025-04-07 SDOH — ECONOMIC STABILITY: FOOD INSECURITY: WITHIN THE PAST 12 MONTHS, THE FOOD YOU BOUGHT JUST DIDN'T LAST AND YOU DIDN'T HAVE MONEY TO GET MORE.: NEVER TRUE

## 2025-04-07 ASSESSMENT — PATIENT HEALTH QUESTIONNAIRE - PHQ9
1. LITTLE INTEREST OR PLEASURE IN DOING THINGS: NOT AT ALL
SUM OF ALL RESPONSES TO PHQ QUESTIONS 1-9: 0
8. MOVING OR SPEAKING SO SLOWLY THAT OTHER PEOPLE COULD HAVE NOTICED. OR THE OPPOSITE, BEING SO FIGETY OR RESTLESS THAT YOU HAVE BEEN MOVING AROUND A LOT MORE THAN USUAL: NOT AT ALL
5. POOR APPETITE OR OVEREATING: NOT AT ALL
SUM OF ALL RESPONSES TO PHQ QUESTIONS 1-9: 0
10. IF YOU CHECKED OFF ANY PROBLEMS, HOW DIFFICULT HAVE THESE PROBLEMS MADE IT FOR YOU TO DO YOUR WORK, TAKE CARE OF THINGS AT HOME, OR GET ALONG WITH OTHER PEOPLE: NOT DIFFICULT AT ALL
SUM OF ALL RESPONSES TO PHQ QUESTIONS 1-9: 0
2. FEELING DOWN, DEPRESSED OR HOPELESS: NOT AT ALL
9. THOUGHTS THAT YOU WOULD BE BETTER OFF DEAD, OR OF HURTING YOURSELF: NOT AT ALL
7. TROUBLE CONCENTRATING ON THINGS, SUCH AS READING THE NEWSPAPER OR WATCHING TELEVISION: NOT AT ALL
4. FEELING TIRED OR HAVING LITTLE ENERGY: NOT AT ALL
SUM OF ALL RESPONSES TO PHQ QUESTIONS 1-9: 0
3. TROUBLE FALLING OR STAYING ASLEEP: NOT AT ALL
6. FEELING BAD ABOUT YOURSELF - OR THAT YOU ARE A FAILURE OR HAVE LET YOURSELF OR YOUR FAMILY DOWN: NOT AT ALL

## 2025-04-07 NOTE — PROGRESS NOTES
Vonda Gtz is a 64 y.o. female , id x 2(name and ). Reviewed record, history, and  medications.      Chief Complaint   Patient presents with    Finger Laceration     Patient hit knuckle on a raised bed, right pointer finger, since Friday, patient having swelling.    Back Pain     Patient c/o back pain, lower back right, radiates to the right around the hip area. Patient needs a referral, patient previous Ortho PCP has retired. Patient requesting Osteopath PCP.          Vitals:    25 1129   Weight: 88.5 kg (195 lb)   Height: 1.727 m (5' 8\")             2025    11:33 AM   PHQ-9    Little interest or pleasure in doing things 0   Feeling down, depressed, or hopeless 0   Trouble falling or staying asleep, or sleeping too much 0   Feeling tired or having little energy 0   Poor appetite or overeating 0   Feeling bad about yourself - or that you are a failure or have let yourself or your family down 0   Trouble concentrating on things, such as reading the newspaper or watching television 0   Moving or speaking so slowly that other people could have noticed. Or the opposite - being so fidgety or restless that you have been moving around a lot more than usual 0   Thoughts that you would be better off dead, or of hurting yourself in some way 0   PHQ-2 Score 0   PHQ-9 Total Score 0   If you checked off any problems, how difficult have these problems made it for you to do your work, take care of things at home, or get along with other people? 0            No data to display                Social Drivers of Health     Tobacco Use: Low Risk  (2025)    Patient History     Smoking Tobacco Use: Never     Smokeless Tobacco Use: Never     Passive Exposure: Not on file   Alcohol Use: Not At Risk (2024)    AUDIT-C     Frequency of Alcohol Consumption: Never     Average Number of Drinks: Patient does not drink     Frequency of Binge Drinking: Never   Financial Resource Strain: Not on file   Food Insecurity: No 
 The patient understands and agrees with the plan. The patient has received an after-visit summary and questions were answered concerning future plans.     Medication Side Effects and Warnings were discussed with patient  Patient Labs were reviewed and or requested:  Patient Past Records were reviewed and or requested    Please note that this dictation was completed with [employer], the computer voice recognition software and [employer] intellience software.  Quite often unanticipated grammatical, syntax, homophones, and other interpretive errors are inadvertently transcribed by the computer software.  Please disregard these errors.  Please excuse any errors that have escaped final proofreading.  Thank you.     Georgia Dexter PA-C    There are no Patient Instructions on file for this visit.

## 2025-04-08 ENCOUNTER — CLINICAL DOCUMENTATION (OUTPATIENT)
Dept: SPIRITUAL SERVICES | Age: 65
End: 2025-04-08

## 2025-04-08 NOTE — ACP (ADVANCE CARE PLANNING)
Advance Care Planning   Ambulatory ACP Specialist Patient Outreach    Date:  4/8/2025    ACP Specialist:  Maricel Maloney    Outreach call to patient in follow-up to ACP Specialist referral from: Georgia Dexter PA-C    [] PCP  [x] Provider   [] Ambulatory Care Management [] Other     For:                  [] Advance Directive Assistance              [] Complete Portable DNR order              [] Complete POST/POLST/MOST              [] Code Status Discussion             [] Discuss Goals of Care             [x] Early ACP Decision-Making              [] Other (Specify)    Date Referral Received: 4/7/2025    Next Step:   [] ACP scheduled conversation  [x] Outreach again in one week               [] Email / Mail ACP Info Sheets  [] Email / Mail Advance Directive   [] Closing referral.  Routing closure to referring provider/staff and to ACP Specialist .    [] Closure letter mailed to patient with invitation to contact ACP Specialist if / when ready.   [] Other (Specify here):       [x] At this time, Healthcare Decision Maker Is:         Primary Decision Maker: Minh Gtz - Spouse - [phone]      [] Primary agent named in scanned advance directive.    [x] Legal Next of Kin.     [] Unable to determine legal decision maker at this time.    Outreaches:       [x] 1st -  Date:  4/8/2025               Intervention:  [] Spoke with Patient   [] Left Voice mail [x] Email / Mail    [] [facility]  [] Other (Specify) :     Outcomes:  Writer attempted ACP outreach to the one number listed for both, patient's home and mobile ([phone]) - no answer.  Writer unable to leave voicemail as mailbox is full.  ACP outreach sent to patient's email address on file with a copy of [facility] and ACP information sheets \"What is Advance Care Planning\" and \"How to Choose a Health Care Agent\" (ACP Coordinator contact information included).   ACP outreach will be attempted in about one week if return call not received.

## 2025-08-21 ENCOUNTER — HOSPITAL ENCOUNTER (OUTPATIENT)
Facility: HOSPITAL | Age: 65
Discharge: HOME | End: 2025-08-24
Payer: MEDICARE

## 2025-08-21 ENCOUNTER — OFFICE VISIT (OUTPATIENT)
Facility: CLINIC | Age: 65
End: 2025-08-21
Payer: MEDICARE

## 2025-08-21 VITALS
HEART RATE: 76 BPM | RESPIRATION RATE: 20 BRPM | BODY MASS INDEX: 29.4 KG/M2 | TEMPERATURE: 98.1 F | DIASTOLIC BLOOD PRESSURE: 70 MMHG | SYSTOLIC BLOOD PRESSURE: 110 MMHG | HEIGHT: 68 IN | WEIGHT: 194 LBS | OXYGEN SATURATION: 95 %

## 2025-08-21 DIAGNOSIS — H53.9 VISUAL CHANGES: ICD-10-CM

## 2025-08-21 DIAGNOSIS — H57.11 EYE PAIN, RIGHT: ICD-10-CM

## 2025-08-21 DIAGNOSIS — R51.9 RIGHT SIDED FACIAL PAIN: Primary | ICD-10-CM

## 2025-08-21 DIAGNOSIS — R51.9 RIGHT SIDED FACIAL PAIN: ICD-10-CM

## 2025-08-21 PROCEDURE — G8427 DOCREV CUR MEDS BY ELIG CLIN: HCPCS | Performed by: PHYSICIAN ASSISTANT

## 2025-08-21 PROCEDURE — 99214 OFFICE O/P EST MOD 30 MIN: CPT | Performed by: PHYSICIAN ASSISTANT

## 2025-08-21 PROCEDURE — 1036F TOBACCO NON-USER: CPT | Performed by: PHYSICIAN ASSISTANT

## 2025-08-21 PROCEDURE — 3017F COLORECTAL CA SCREEN DOC REV: CPT | Performed by: PHYSICIAN ASSISTANT

## 2025-08-21 PROCEDURE — G8417 CALC BMI ABV UP PARAM F/U: HCPCS | Performed by: PHYSICIAN ASSISTANT

## 2025-08-21 PROCEDURE — 1123F ACP DISCUSS/DSCN MKR DOCD: CPT | Performed by: PHYSICIAN ASSISTANT

## 2025-08-21 PROCEDURE — 1090F PRES/ABSN URINE INCON ASSESS: CPT | Performed by: PHYSICIAN ASSISTANT

## 2025-08-21 PROCEDURE — 70450 CT HEAD/BRAIN W/O DYE: CPT

## 2025-08-21 PROCEDURE — G8400 PT W/DXA NO RESULTS DOC: HCPCS | Performed by: PHYSICIAN ASSISTANT

## 2025-08-21 RX ORDER — FLUOCINONIDE TOPICAL SOLUTION USP, 0.05% 0.5 MG/ML
SOLUTION TOPICAL
COMMUNITY
Start: 2025-06-09

## 2025-09-05 ENCOUNTER — OFFICE VISIT (OUTPATIENT)
Facility: CLINIC | Age: 65
End: 2025-09-05

## 2025-09-05 VITALS
SYSTOLIC BLOOD PRESSURE: 132 MMHG | OXYGEN SATURATION: 95 % | RESPIRATION RATE: 16 BRPM | BODY MASS INDEX: 28.95 KG/M2 | HEART RATE: 75 BPM | WEIGHT: 191 LBS | DIASTOLIC BLOOD PRESSURE: 88 MMHG | HEIGHT: 68 IN | TEMPERATURE: 98.7 F

## 2025-09-05 DIAGNOSIS — R30.0 DYSURIA: ICD-10-CM

## 2025-09-05 DIAGNOSIS — Z13.220 SCREENING FOR HYPERLIPIDEMIA: ICD-10-CM

## 2025-09-05 DIAGNOSIS — Z00.01 ENCOUNTER FOR GENERAL ADULT MEDICAL EXAMINATION WITH ABNORMAL FINDINGS: Primary | ICD-10-CM

## 2025-09-05 DIAGNOSIS — Z00.00 PHYSICAL EXAM: ICD-10-CM

## 2025-09-05 DIAGNOSIS — R73.03 PREDIABETES: ICD-10-CM

## 2025-09-05 DIAGNOSIS — R53.83 FATIGUE, UNSPECIFIED TYPE: ICD-10-CM

## 2025-09-05 DIAGNOSIS — Z00.00 MEDICARE ANNUAL WELLNESS VISIT, SUBSEQUENT: ICD-10-CM

## 2025-09-05 DIAGNOSIS — M25.551 BILATERAL HIP PAIN: ICD-10-CM

## 2025-09-05 DIAGNOSIS — F32.A DEPRESSION, UNSPECIFIED DEPRESSION TYPE: ICD-10-CM

## 2025-09-05 DIAGNOSIS — M25.552 BILATERAL HIP PAIN: ICD-10-CM

## 2025-09-05 DIAGNOSIS — E78.2 MIXED HYPERLIPIDEMIA: ICD-10-CM

## 2025-09-05 DIAGNOSIS — M51.369 DEGENERATION OF INTERVERTEBRAL DISC OF LUMBAR REGION, UNSPECIFIED WHETHER PAIN PRESENT: ICD-10-CM

## 2025-09-05 SDOH — ECONOMIC STABILITY: FOOD INSECURITY: WITHIN THE PAST 12 MONTHS, THE FOOD YOU BOUGHT JUST DIDN'T LAST AND YOU DIDN'T HAVE MONEY TO GET MORE.: NEVER TRUE

## 2025-09-05 SDOH — ECONOMIC STABILITY: FOOD INSECURITY: WITHIN THE PAST 12 MONTHS, YOU WORRIED THAT YOUR FOOD WOULD RUN OUT BEFORE YOU GOT MONEY TO BUY MORE.: NEVER TRUE

## 2025-09-05 ASSESSMENT — ENCOUNTER SYMPTOMS
SINUS PAIN: 0
SHORTNESS OF BREATH: 0
SORE THROAT: 0
ABDOMINAL PAIN: 0
ALLERGIC/IMMUNOLOGIC NEGATIVE: 1
EYES NEGATIVE: 1
RHINORRHEA: 0
CONSTIPATION: 0
COUGH: 0
DIARRHEA: 0

## 2025-09-05 ASSESSMENT — PATIENT HEALTH QUESTIONNAIRE - PHQ9
SUM OF ALL RESPONSES TO PHQ QUESTIONS 1-9: 0
2. FEELING DOWN, DEPRESSED OR HOPELESS: NOT AT ALL
1. LITTLE INTEREST OR PLEASURE IN DOING THINGS: NOT AT ALL
SUM OF ALL RESPONSES TO PHQ QUESTIONS 1-9: 0

## 2025-09-05 ASSESSMENT — LIFESTYLE VARIABLES
HOW MANY STANDARD DRINKS CONTAINING ALCOHOL DO YOU HAVE ON A TYPICAL DAY: PATIENT DOES NOT DRINK
HOW OFTEN DO YOU HAVE A DRINK CONTAINING ALCOHOL: NEVER

## (undated) DEVICE — PENCIL ES CRD L10FT HND SWCHING ROCK SWCH W/ EDGE COAT BLDE

## (undated) DEVICE — CYSTO/BLADDER IRRIGATION SET, REGULATING CLAMP

## (undated) DEVICE — SHEET,DRAPE,UNDERBUTTOCK,GRAD POUCH,PORT: Brand: MEDLINE

## (undated) DEVICE — DRAPE SURG CYSTO N REINF ST W O FLD PCH STD

## (undated) DEVICE — HOOK RETRCT L5MM E SHRP SELF RET SYS LONE STAR

## (undated) DEVICE — SUTURE VICRYL + SZ 0 L27IN ABSRB WHT CT-2 1/2 CIR TAPERPOINT VCP270H

## (undated) DEVICE — PAD,SANITARY,11 IN,MAXI,N-STRL,IND WRAP: Brand: MEDLINE

## (undated) DEVICE — ELECTRODE PT RET AD L9FT HI MOIST COND ADH HYDRGEL CORDED

## (undated) DEVICE — SUTURE VICRYL + SZ 2-0 L27IN ABSRB VLT UR-6 5/8 CIR TAPR PNT VCP602H

## (undated) DEVICE — INTENT OT USE PROVIDES A STERILE INTERFACE BETWEEN THE OPERATING ROOM SURGICAL LAMPS (NON-STERILE) AND THE SURGEON OR STAFF WORKING IN THE STERILE FIELD.: Brand: ASPEN® ALC PLUS LIGHT HANDLE COVER

## (undated) DEVICE — GLOVE SURG SZ 65 L12IN FNGR THK79MIL GRN LTX FREE

## (undated) DEVICE — SUTURE VICRYL SZ 0 L18IN ABSRB VLT L26MM CT-2 1/2 CIR J727D

## (undated) DEVICE — FLOSEAL WITH RECOTHROM - 10ML.: Brand: FLOSEAL HEMOSTATIC MATRIX

## (undated) DEVICE — X-RAY DETECTABLE SPONGES,16 PLY: Brand: VISTEC

## (undated) DEVICE — SYRINGE MED 10ML TRNSLUC BRL PLUNG BLK MRK POLYPR CTRL

## (undated) DEVICE — SOLUTION IRRIGATION STRL H2O 1000 ML UROMATIC CONTAINER

## (undated) DEVICE — SKIN PREP TRAY 4 COMPARTM TRAY: Brand: MEDLINE INDUSTRIES, INC.

## (undated) DEVICE — HYPODERMIC SAFETY NEEDLE: Brand: MAGELLAN

## (undated) DEVICE — GLOVE SURG SZ 65 L12IN FNGR THK94MIL STD WHT LTX FREE

## (undated) DEVICE — TOWEL,OR,DSP,ST,BLUE,STD,4/PK,20PK/CS: Brand: MEDLINE

## (undated) DEVICE — BASIN ST MAJOR-NO CAUTERY: Brand: MEDLINE INDUSTRIES, INC.

## (undated) DEVICE — SOLUTION INJ 1000ML ST H2O FLX CONT

## (undated) DEVICE — PACK,BASIC,SIRUS,V: Brand: MEDLINE

## (undated) DEVICE — SUTURE PDS II SZ 2-0 L27IN ABSRB VLT L26MM CT-2 1/2 CIR Z333H

## (undated) DEVICE — 1LYRTR 16FR10ML 100%SILI SNAP: Brand: MEDLINE INDUSTRIES, INC.